# Patient Record
Sex: FEMALE | Race: WHITE | NOT HISPANIC OR LATINO | Employment: OTHER | URBAN - METROPOLITAN AREA
[De-identification: names, ages, dates, MRNs, and addresses within clinical notes are randomized per-mention and may not be internally consistent; named-entity substitution may affect disease eponyms.]

---

## 2017-01-25 ENCOUNTER — COMMUNICATION - HEALTHEAST (OUTPATIENT)
Dept: CARDIOLOGY | Facility: CLINIC | Age: 67
End: 2017-01-25

## 2017-01-26 ENCOUNTER — AMBULATORY - HEALTHEAST (OUTPATIENT)
Dept: CARDIOLOGY | Facility: CLINIC | Age: 67
End: 2017-01-26

## 2017-01-26 ENCOUNTER — COMMUNICATION - HEALTHEAST (OUTPATIENT)
Dept: CARDIOLOGY | Facility: CLINIC | Age: 67
End: 2017-01-26

## 2017-01-26 DIAGNOSIS — E78.5 HYPERLIPEMIA: ICD-10-CM

## 2017-01-27 ENCOUNTER — AMBULATORY - HEALTHEAST (OUTPATIENT)
Dept: CARDIOLOGY | Facility: CLINIC | Age: 67
End: 2017-01-27

## 2017-02-15 ENCOUNTER — AMBULATORY - HEALTHEAST (OUTPATIENT)
Dept: CARDIOLOGY | Facility: CLINIC | Age: 67
End: 2017-02-15

## 2017-02-21 ENCOUNTER — COMMUNICATION - HEALTHEAST (OUTPATIENT)
Dept: CARDIOLOGY | Facility: CLINIC | Age: 67
End: 2017-02-21

## 2017-04-04 ENCOUNTER — COMMUNICATION - HEALTHEAST (OUTPATIENT)
Dept: CARDIOLOGY | Facility: CLINIC | Age: 67
End: 2017-04-04

## 2017-06-12 ENCOUNTER — AMBULATORY - HEALTHEAST (OUTPATIENT)
Dept: CARDIOLOGY | Facility: CLINIC | Age: 67
End: 2017-06-12

## 2017-06-12 DIAGNOSIS — E78.5 HYPERLIPEMIA: ICD-10-CM

## 2017-06-12 LAB
CHOLEST SERPL-MCNC: 280 MG/DL
FASTING STATUS PATIENT QL REPORTED: YES
HDLC SERPL-MCNC: 54 MG/DL
LDLC SERPL CALC-MCNC: 211 MG/DL
TRIGL SERPL-MCNC: 73 MG/DL

## 2017-06-30 ENCOUNTER — COMMUNICATION - HEALTHEAST (OUTPATIENT)
Dept: CARDIOLOGY | Facility: CLINIC | Age: 67
End: 2017-06-30

## 2017-06-30 DIAGNOSIS — E78.5 HYPERLIPEMIA: ICD-10-CM

## 2017-07-06 ENCOUNTER — COMMUNICATION - HEALTHEAST (OUTPATIENT)
Dept: CARDIOLOGY | Facility: CLINIC | Age: 67
End: 2017-07-06

## 2017-10-17 ENCOUNTER — COMMUNICATION - HEALTHEAST (OUTPATIENT)
Dept: CARDIOLOGY | Facility: CLINIC | Age: 67
End: 2017-10-17

## 2017-10-17 DIAGNOSIS — E78.5 HYPERLIPEMIA: ICD-10-CM

## 2017-10-18 ENCOUNTER — COMMUNICATION - HEALTHEAST (OUTPATIENT)
Dept: CARDIOLOGY | Facility: CLINIC | Age: 67
End: 2017-10-18

## 2017-10-18 DIAGNOSIS — E78.5 HYPERLIPEMIA: ICD-10-CM

## 2017-11-14 ENCOUNTER — OFFICE VISIT - HEALTHEAST (OUTPATIENT)
Dept: CARDIOLOGY | Facility: CLINIC | Age: 67
End: 2017-11-14

## 2017-11-14 DIAGNOSIS — I49.1 PAC (PREMATURE ATRIAL CONTRACTION): ICD-10-CM

## 2017-11-14 DIAGNOSIS — E78.00 HYPERCHOLESTEREMIA: ICD-10-CM

## 2017-11-14 DIAGNOSIS — E78.00 PURE HYPERCHOLESTEROLEMIA: ICD-10-CM

## 2017-11-14 DIAGNOSIS — I25.10 CORONARY ARTERY DISEASE INVOLVING NATIVE CORONARY ARTERY OF NATIVE HEART WITHOUT ANGINA PECTORIS: ICD-10-CM

## 2017-11-14 RX ORDER — EZETIMIBE 10 MG/1
10 TABLET ORAL DAILY
Qty: 90 TABLET | Refills: 4 | Status: SHIPPED | OUTPATIENT
Start: 2017-11-14

## 2017-11-14 ASSESSMENT — MIFFLIN-ST. JEOR: SCORE: 1020.86

## 2018-02-12 ENCOUNTER — RECORDS - HEALTHEAST (OUTPATIENT)
Dept: LAB | Facility: CLINIC | Age: 68
End: 2018-02-12

## 2018-02-12 LAB
ALBUMIN SERPL-MCNC: 3.5 G/DL (ref 3.5–5)
ALP SERPL-CCNC: 74 U/L (ref 45–120)
ALT SERPL W P-5'-P-CCNC: 16 U/L (ref 0–45)
AST SERPL W P-5'-P-CCNC: 23 U/L (ref 0–40)
BASOPHILS # BLD AUTO: 0.1 THOU/UL (ref 0–0.2)
BASOPHILS NFR BLD AUTO: 1 % (ref 0–2)
BILIRUB SERPL-MCNC: 0.7 MG/DL (ref 0–1)
BUN SERPL-MCNC: 17 MG/DL (ref 8–22)
CALCIUM SERPL-MCNC: 8.8 MG/DL (ref 8.5–10.5)
CHLORIDE BLD-SCNC: 106 MMOL/L (ref 98–107)
CHOLEST SERPL-MCNC: 263 MG/DL
CHOLEST SERPL-MCNC: 263 MG/DL
CK SERPL-CCNC: 77 U/L (ref 30–190)
CO2 SERPL-SCNC: 26 MMOL/L (ref 22–31)
CREAT SERPL-MCNC: 0.66 MG/DL (ref 0.6–1.1)
CRP SERPL HS-MCNC: 0.2 MG/L (ref 0–3)
EOSINOPHIL # BLD AUTO: 0.3 THOU/UL (ref 0–0.4)
EOSINOPHIL NFR BLD AUTO: 5 % (ref 0–6)
ERYTHROCYTE [DISTWIDTH] IN BLOOD BY AUTOMATED COUNT: 13.3 % (ref 11–14.5)
ERYTHROCYTE [SEDIMENTATION RATE] IN BLOOD BY WESTERGREN METHOD: 8 MM/HR (ref 0–20)
FASTING STATUS PATIENT QL REPORTED: ABNORMAL
GFR SERPL CREATININE-BSD FRML MDRD: >60 ML/MIN/1.73M2
GGT SERPL-CCNC: 10 U/L (ref 0–50)
GLUCOSE BLD-MCNC: 85 MG/DL (ref 70–125)
HCT VFR BLD AUTO: 40.6 % (ref 35–47)
HDLC SERPL-MCNC: 53 MG/DL
HGB BLD-MCNC: 13.3 G/DL (ref 12–16)
IRON SERPL-MCNC: 109 UG/DL (ref 42–175)
LDH SERPL L TO P-CCNC: 155 U/L (ref 125–220)
LDLC SERPL CALC-MCNC: 190 MG/DL
LYMPHOCYTES # BLD AUTO: 1.8 THOU/UL (ref 0.8–4.4)
LYMPHOCYTES NFR BLD AUTO: 37 % (ref 20–40)
MAGNESIUM SERPL-MCNC: 1.9 MG/DL (ref 1.8–2.6)
MCH RBC QN AUTO: 30.5 PG (ref 27–34)
MCHC RBC AUTO-ENTMCNC: 32.8 G/DL (ref 32–36)
MCV RBC AUTO: 93 FL (ref 80–100)
MONOCYTES # BLD AUTO: 0.4 THOU/UL (ref 0–0.9)
MONOCYTES NFR BLD AUTO: 8 % (ref 2–10)
NEUTROPHILS # BLD AUTO: 2.5 THOU/UL (ref 2–7.7)
NEUTROPHILS NFR BLD AUTO: 49 % (ref 50–70)
PHOSPHATE SERPL-MCNC: 3.9 MG/DL (ref 2.5–4.5)
PLATELET # BLD AUTO: 195 THOU/UL (ref 140–440)
PMV BLD AUTO: 11.4 FL (ref 8.5–12.5)
POTASSIUM BLD-SCNC: 3.5 MMOL/L (ref 3.5–5)
PROT SERPL-MCNC: 6.5 G/DL (ref 6–8)
RBC # BLD AUTO: 4.36 MILL/UL (ref 3.8–5.4)
SODIUM SERPL-SCNC: 139 MMOL/L (ref 136–145)
TRIGL SERPL-MCNC: 99 MG/DL
TRIGL SERPL-MCNC: 99 MG/DL
TSH SERPL DL<=0.005 MIU/L-ACNC: 1.27 UIU/ML (ref 0.3–5)
URATE SERPL-MCNC: 3.7 MG/DL (ref 2–7.5)
WBC: 5 THOU/UL (ref 4–11)

## 2018-05-12 ENCOUNTER — RECORDS - HEALTHEAST (OUTPATIENT)
Dept: LAB | Facility: CLINIC | Age: 68
End: 2018-05-12

## 2018-05-14 LAB — BACTERIA SPEC CULT: ABNORMAL

## 2018-05-22 ENCOUNTER — RECORDS - HEALTHEAST (OUTPATIENT)
Dept: LAB | Facility: CLINIC | Age: 68
End: 2018-05-22

## 2018-05-25 LAB — BACTERIA SPEC CULT: ABNORMAL

## 2018-06-04 ENCOUNTER — RECORDS - HEALTHEAST (OUTPATIENT)
Dept: LAB | Facility: CLINIC | Age: 68
End: 2018-06-04

## 2018-06-05 LAB — BACTERIA SPEC CULT: NO GROWTH

## 2018-11-29 ENCOUNTER — SURGERY - HEALTHEAST (OUTPATIENT)
Dept: CARDIOLOGY | Facility: CLINIC | Age: 68
End: 2018-11-29

## 2018-11-29 ASSESSMENT — MIFFLIN-ST. JEOR: SCORE: 998.13

## 2018-11-30 ASSESSMENT — MIFFLIN-ST. JEOR: SCORE: 1014.4

## 2018-12-01 ASSESSMENT — MIFFLIN-ST. JEOR: SCORE: 1001.24

## 2018-12-02 ASSESSMENT — MIFFLIN-ST. JEOR: SCORE: 997.16

## 2018-12-03 ENCOUNTER — AMBULATORY - HEALTHEAST (OUTPATIENT)
Dept: CARDIOLOGY | Facility: CLINIC | Age: 68
End: 2018-12-03

## 2018-12-03 DIAGNOSIS — I25.10 CAD (CORONARY ARTERY DISEASE): ICD-10-CM

## 2018-12-03 ASSESSMENT — MIFFLIN-ST. JEOR: SCORE: 997.61

## 2018-12-07 ENCOUNTER — AMBULATORY - HEALTHEAST (OUTPATIENT)
Dept: CARDIAC REHAB | Facility: HOSPITAL | Age: 68
End: 2018-12-07

## 2018-12-07 DIAGNOSIS — I21.3 STEMI (ST ELEVATION MYOCARDIAL INFARCTION) (H): ICD-10-CM

## 2018-12-07 DIAGNOSIS — I25.119 CORONARY ARTERY DISEASE INVOLVING NATIVE CORONARY ARTERY OF NATIVE HEART WITH ANGINA PECTORIS (H): ICD-10-CM

## 2018-12-07 DIAGNOSIS — Z95.5 STENTED CORONARY ARTERY: ICD-10-CM

## 2018-12-10 ENCOUNTER — AMBULATORY - HEALTHEAST (OUTPATIENT)
Dept: CARDIAC REHAB | Facility: HOSPITAL | Age: 68
End: 2018-12-10

## 2018-12-10 DIAGNOSIS — Z95.5 STENTED CORONARY ARTERY: ICD-10-CM

## 2018-12-10 DIAGNOSIS — I21.02 ST ELEVATION MYOCARDIAL INFARCTION INVOLVING LEFT ANTERIOR DESCENDING (LAD) CORONARY ARTERY (H): ICD-10-CM

## 2018-12-12 ENCOUNTER — AMBULATORY - HEALTHEAST (OUTPATIENT)
Dept: CARDIOLOGY | Facility: CLINIC | Age: 68
End: 2018-12-12

## 2018-12-12 ENCOUNTER — RECORDS - HEALTHEAST (OUTPATIENT)
Dept: ADMINISTRATIVE | Facility: OTHER | Age: 68
End: 2018-12-12

## 2018-12-12 ENCOUNTER — AMBULATORY - HEALTHEAST (OUTPATIENT)
Dept: CARDIAC REHAB | Facility: HOSPITAL | Age: 68
End: 2018-12-12

## 2018-12-12 DIAGNOSIS — Z95.5 STENTED CORONARY ARTERY: ICD-10-CM

## 2018-12-12 DIAGNOSIS — I21.02 ST ELEVATION MYOCARDIAL INFARCTION INVOLVING LEFT ANTERIOR DESCENDING (LAD) CORONARY ARTERY (H): ICD-10-CM

## 2018-12-14 ENCOUNTER — AMBULATORY - HEALTHEAST (OUTPATIENT)
Dept: CARDIOLOGY | Facility: CLINIC | Age: 68
End: 2018-12-14

## 2018-12-14 ENCOUNTER — OFFICE VISIT - HEALTHEAST (OUTPATIENT)
Dept: CARDIOLOGY | Facility: CLINIC | Age: 68
End: 2018-12-14

## 2018-12-14 ENCOUNTER — AMBULATORY - HEALTHEAST (OUTPATIENT)
Dept: CARDIAC REHAB | Facility: HOSPITAL | Age: 68
End: 2018-12-14

## 2018-12-14 DIAGNOSIS — I21.02 ACUTE ST ELEVATION MYOCARDIAL INFARCTION (STEMI) INVOLVING LEFT ANTERIOR DESCENDING (LAD) CORONARY ARTERY (H): ICD-10-CM

## 2018-12-14 DIAGNOSIS — I25.83 CORONARY ARTERY DISEASE DUE TO LIPID RICH PLAQUE: ICD-10-CM

## 2018-12-14 DIAGNOSIS — I21.02 ST ELEVATION MYOCARDIAL INFARCTION INVOLVING LEFT ANTERIOR DESCENDING (LAD) CORONARY ARTERY (H): ICD-10-CM

## 2018-12-14 DIAGNOSIS — I50.22 CHRONIC SYSTOLIC HEART FAILURE (H): ICD-10-CM

## 2018-12-14 DIAGNOSIS — E78.5 DYSLIPIDEMIA, GOAL LDL BELOW 70: ICD-10-CM

## 2018-12-14 DIAGNOSIS — I25.10 CORONARY ARTERY DISEASE DUE TO LIPID RICH PLAQUE: ICD-10-CM

## 2018-12-14 DIAGNOSIS — Z95.5 STENTED CORONARY ARTERY: ICD-10-CM

## 2018-12-14 ASSESSMENT — MIFFLIN-ST. JEOR: SCORE: 996.71

## 2018-12-17 ENCOUNTER — AMBULATORY - HEALTHEAST (OUTPATIENT)
Dept: CARDIAC REHAB | Facility: HOSPITAL | Age: 68
End: 2018-12-17

## 2018-12-17 DIAGNOSIS — I21.02 ST ELEVATION MYOCARDIAL INFARCTION INVOLVING LEFT ANTERIOR DESCENDING (LAD) CORONARY ARTERY (H): ICD-10-CM

## 2018-12-17 DIAGNOSIS — Z95.5 STENTED CORONARY ARTERY: ICD-10-CM

## 2018-12-19 ENCOUNTER — AMBULATORY - HEALTHEAST (OUTPATIENT)
Dept: CARDIAC REHAB | Facility: HOSPITAL | Age: 68
End: 2018-12-19

## 2018-12-19 DIAGNOSIS — Z95.5 STENTED CORONARY ARTERY: ICD-10-CM

## 2018-12-19 DIAGNOSIS — I21.02 ST ELEVATION MYOCARDIAL INFARCTION INVOLVING LEFT ANTERIOR DESCENDING (LAD) CORONARY ARTERY (H): ICD-10-CM

## 2018-12-24 ENCOUNTER — AMBULATORY - HEALTHEAST (OUTPATIENT)
Dept: CARDIAC REHAB | Facility: HOSPITAL | Age: 68
End: 2018-12-24

## 2018-12-24 DIAGNOSIS — Z95.5 STENTED CORONARY ARTERY: ICD-10-CM

## 2018-12-26 ENCOUNTER — AMBULATORY - HEALTHEAST (OUTPATIENT)
Dept: CARDIAC REHAB | Facility: HOSPITAL | Age: 68
End: 2018-12-26

## 2018-12-26 DIAGNOSIS — Z95.5 STENTED CORONARY ARTERY: ICD-10-CM

## 2018-12-26 DIAGNOSIS — I21.02 ST ELEVATION MYOCARDIAL INFARCTION INVOLVING LEFT ANTERIOR DESCENDING (LAD) CORONARY ARTERY (H): ICD-10-CM

## 2018-12-28 ENCOUNTER — AMBULATORY - HEALTHEAST (OUTPATIENT)
Dept: CARDIAC REHAB | Facility: HOSPITAL | Age: 68
End: 2018-12-28

## 2018-12-28 DIAGNOSIS — I21.02 ST ELEVATION MYOCARDIAL INFARCTION INVOLVING LEFT ANTERIOR DESCENDING (LAD) CORONARY ARTERY (H): ICD-10-CM

## 2018-12-28 DIAGNOSIS — Z95.5 STENTED CORONARY ARTERY: ICD-10-CM

## 2018-12-31 ENCOUNTER — AMBULATORY - HEALTHEAST (OUTPATIENT)
Dept: CARDIAC REHAB | Facility: HOSPITAL | Age: 68
End: 2018-12-31

## 2018-12-31 DIAGNOSIS — Z95.5 STENTED CORONARY ARTERY: ICD-10-CM

## 2018-12-31 DIAGNOSIS — I21.02 ST ELEVATION MYOCARDIAL INFARCTION INVOLVING LEFT ANTERIOR DESCENDING (LAD) CORONARY ARTERY (H): ICD-10-CM

## 2019-01-15 ENCOUNTER — RECORDS - HEALTHEAST (OUTPATIENT)
Dept: LAB | Facility: CLINIC | Age: 69
End: 2019-01-15

## 2019-01-15 LAB
CHOLEST SERPL-MCNC: 140 MG/DL
FASTING STATUS PATIENT QL REPORTED: ABNORMAL
HDLC SERPL-MCNC: 37 MG/DL
LDLC SERPL CALC-MCNC: 83 MG/DL
TRIGL SERPL-MCNC: 98 MG/DL

## 2019-01-17 LAB — BACTERIA SPEC CULT: ABNORMAL

## 2019-03-01 ENCOUNTER — RECORDS - HEALTHEAST (OUTPATIENT)
Dept: LAB | Facility: CLINIC | Age: 69
End: 2019-03-01

## 2019-03-04 LAB — BACTERIA SPEC CULT: NORMAL

## 2021-05-22 ENCOUNTER — HEALTH MAINTENANCE LETTER (OUTPATIENT)
Age: 71
End: 2021-05-22

## 2021-05-24 ENCOUNTER — RECORDS - HEALTHEAST (OUTPATIENT)
Dept: ADMINISTRATIVE | Facility: CLINIC | Age: 71
End: 2021-05-24

## 2021-05-25 ENCOUNTER — RECORDS - HEALTHEAST (OUTPATIENT)
Dept: ADMINISTRATIVE | Facility: CLINIC | Age: 71
End: 2021-05-25

## 2021-05-27 ENCOUNTER — RECORDS - HEALTHEAST (OUTPATIENT)
Dept: ADMINISTRATIVE | Facility: CLINIC | Age: 71
End: 2021-05-27

## 2021-05-29 ENCOUNTER — RECORDS - HEALTHEAST (OUTPATIENT)
Dept: ADMINISTRATIVE | Facility: CLINIC | Age: 71
End: 2021-05-29

## 2021-05-31 VITALS — BODY MASS INDEX: 20.93 KG/M2 | HEIGHT: 63 IN | WEIGHT: 118.1 LBS

## 2021-06-02 VITALS — WEIGHT: 113 LBS | BODY MASS INDEX: 20.02 KG/M2

## 2021-06-02 VITALS — BODY MASS INDEX: 19.84 KG/M2 | WEIGHT: 112 LBS

## 2021-06-02 VITALS — BODY MASS INDEX: 19.83 KG/M2 | HEIGHT: 63 IN | BODY MASS INDEX: 19.84 KG/M2 | WEIGHT: 112 LBS | WEIGHT: 111.9 LBS

## 2021-06-02 VITALS — WEIGHT: 112.1 LBS | HEIGHT: 63 IN | BODY MASS INDEX: 19.86 KG/M2

## 2021-06-02 VITALS — BODY MASS INDEX: 19.49 KG/M2 | WEIGHT: 110 LBS

## 2021-06-02 VITALS — WEIGHT: 112 LBS | BODY MASS INDEX: 19.84 KG/M2

## 2021-06-02 VITALS — WEIGHT: 114 LBS | BODY MASS INDEX: 20.19 KG/M2

## 2021-06-02 VITALS — BODY MASS INDEX: 20.19 KG/M2 | WEIGHT: 114 LBS

## 2021-06-02 VITALS — BODY MASS INDEX: 20.02 KG/M2 | WEIGHT: 113 LBS

## 2021-06-14 NOTE — PROGRESS NOTES
[facility] Follow-up Note    Assessment & Plan        1. PAC (premature atrial contraction)  -symptomatic PACs.  For a period of time she took some pindolol and has not had any recurrences.  She suspects this is due to increased sugar in her diet as well as radon electronics in the bedroom.  Since then she's getting along well.  I renewed the pindolol but she takes maybe 5 pills a year on an as-needed basis.  She had only only one bad episode over the summer most likely due to dehydration.    2. Pure hypercholesterolemia -significantly elevated cholesterol 280 with an LDL of 211.  She was intolerant of atorvastatin, did not qualify for juxtapid and is currently on Zetia.  I will renew Zetia, see if she qualifies for any of our research trials, if not we will then try Crestor.  If Crestor not tolerated we will then consider trying again for neuropathic.   3. Coronary artery disease involving native coronary artery of native heart without angina pectoris  -angiography on December 2014 showed normal left main, proximal left anterior descending 50% stenosis, normal circumflex, proximal right coronary artery 25% followed by a mid-35% stenosis.  No symptoms and continue to work on prevention.     Plan  1.  Renew Zetia at [facility] mail-order pharmacy.  2.  Check to see if qualifies for research trial for lipids, if not try Crestor and if intolerant that try PCSK9 inhibitor.  3.  Follow-up with me in 1 year or sooner if needed.    Subjective  CC: 67-year-old white female here for yearly follow-up today.  She tells me one episode while may be dehydrated of the summer she had sustained palpitations and took a pindolol.  Otherwise she has no major sustained palpitations, chest discomfort, shortness breath, PND, orthopnea or peripheral edema.    Medications  Current Outpatient Prescriptions   Medication Sig     ASTAXANTHIN ORAL Take by mouth.     calcium carbonate-vitamin D2 500 mg(1,250mg) -200 unit  "tablet Take 1 tablet by mouth 2 (two) times a day.     COQ10, UBIQUINOL, ORAL Take by mouth.     ezetimibe (ZETIA) 10 mg tablet Take 1 tablet (10 mg total) by mouth daily.     magnesium 30 mg tablet Take 30 mg by mouth 2 (two) times a day.     KRILL OIL ORAL Take by mouth.     pindolol (VISKEN) 5 MG tablet Take 2 tablets (10 mg total) by mouth 2 (two) times a day.       Objective  /58 (Patient Site: Left Arm, Patient Position: Sitting, Cuff Size: Adult Regular)  Pulse 65  Resp 16  Ht 5' 2.75\" (1.594 m)  Wt 118 lb 1.6 oz (53.6 kg)  BMI 21.09 kg/m2    General Appearance:    Alert, cooperative, no distress, appears stated age   Head:    Normocephalic, without obvious abnormality, atraumatic   Throat:   Lips, mucosa, and tongue normal; teeth and gums normal   Neck:   Supple, symmetrical, trachea midline, no adenopathy;        thyroid:  No enlargement/tenderness/nodules; no carotid    bruit or JVD   Back:     Symmetric, no curvature, ROM normal, no CVA tenderness   Lungs:     Clear to auscultation bilaterally, respirations unlabored   Chest wall:    No tenderness or deformity   Heart:    Regular rate and rhythm, S1 and S2 normal, no murmur, rub   or gallop   Abdomen:     Soft, non-tender, bowel sounds active all four quadrants,     no masses, no organomegaly   Extremities:   Normal, atraumatic, no cyanosis or edema   Pulses:   2+ and symmetric all extremities   Skin:   Skin color, texture, turgor normal, no rashes or lesions     Results    Lab Results personally reviewed   Lab Results   Component Value Date    CHOL 280 (H) 06/12/2017    CHOL 345 (H) 09/22/2016     Lab Results   Component Value Date    HDL 54 06/12/2017    HDL 49 (L) 09/22/2016     Lab Results   Component Value Date    LDLCALC 211 (H) 06/12/2017    LDLCALC 273 (H) 09/22/2016     Lab Results   Component Value Date    TRIG 73 06/12/2017    TRIG 114 09/22/2016     Lab Results   Component Value Date    WBC 9.1 03/01/2016    HGB 13.9 03/01/2016    " HCT 42.2 03/01/2016     03/01/2016     Lab Results   Component Value Date    CREATININE 0.78 03/01/2016    BUN 19 03/01/2016     03/01/2016    K 3.9 03/01/2016    CO2 23 03/01/2016     Review of Systems:   General: WNL  Eyes: WNL  Ears/Nose/Throat: WNL  Lungs: WNL  Heart: Irregular Heartbeat (palpitatioms)  Stomach: WNL  Bladder: WNL  Muscle/Joints: WNL  Skin: WNL  Nervous System: WNL  Mental Health: WNL     Blood: WNL

## 2021-06-16 PROBLEM — I50.20 HEART FAILURE WITH REDUCED EJECTION FRACTION (H): Status: ACTIVE | Noted: 2018-12-14

## 2021-06-16 PROBLEM — I21.02 ACUTE ST ELEVATION MYOCARDIAL INFARCTION (STEMI) INVOLVING LEFT ANTERIOR DESCENDING (LAD) CORONARY ARTERY (H): Status: ACTIVE | Noted: 2018-11-29

## 2021-06-22 NOTE — PROGRESS NOTES
ITP ASSESSMENT   Assessment Day: 30 Day/Discharge Note:    Session Number: 10  Precautions: Standard    Diagnosis: MI;Stent    Risk Stratification: High    Referring Provider: Jaison Lloyd MD  EXERCISE  Exercise Assessment: Discharge       6 Minute Walk Test   Pre   Pre Exercise HR: 67                    Pre Exercise BP: 101/57      Peak  Peak HR: 106                   Peak BP: 110/70    Peak feet: 1700    Peak O2 SAT: 99    Peak RPE: 12    Peak MPH: 3.22      Symptoms:  Peak Symptoms: none      5 mins. Post  5 Min Post HR: 68    5 Min Post BP: 93/58                           Exercise Plan  Goals Next 30 days  ADL'S: Consistent home exercise Program    Leisure: Babysit Grand daughters, with out fatigue;  Walk or Treadmill 10-15'; 5-7 days er week    Work: Retired      Education Goals: All goals in this section met    Education Goals Met: Has system for taking medication.;Patient can state cardiac s/s and appropriate emergency response.;Medication review.                          Goals Met  Initial ADL's goals met: Tolerates housework    Initial Leisure goals met: Tolerates babysitting granddtrs    No Data Recorded  Initial Progression: Reports she has resumed all tasks at home as to prior to Her MI/Stents      Exercise Prescription  Exercise Mode: Treadmill;Nustep;Bike;Arm Erg.    Frequency: 2-3 x week     Duration: 40'    Intensity / THR: 20-30 beats above resting heart rate    RPE 11-14  Progression / Met level: 2.8-3.2    Resistive Training?: Yes      Current Exercise (mins/week): 180      Interventions  Home Exercise:  Mode: Walk /Treadmill    Frequency: 5-7 days per week    Duration: 30-40'      Education Material : Educational videos;Provide written material;Individual education and counseling;Offer educational classes      Education Completed  Exercise Education Completed: Cardiac Anatomy;Signs and Symptoms;Emergency Plan;RPE;Medication review;Home Exercise;Warm up/cool down;FITT Principles;BP/HR  "Reponse to exercise;Benefits of Exercise;End point of exercise              Exercise Follow-up/Discharge  Follow up/Discharge: Reviewed Home Program, SX of Intolerance; Emergency plan.  Pt plans to use treadmill daily           NUTRITION  Nutrition Assessment: Discharge      Nutrition Risk Factors:  Nutrition Risk Factors: Dyslipidemia  Cholesterol: 262  LDL: 193  HDL: 54  Triglycerides: 74      Nutrition Plan  Interventions  Diet Consult: Completed    Other Nutrition Intervention: Diet Class;Therapist/Pt Discussion;Educational Videos;Provide with Written Material    Initial Rate Your Plate Score: 62      Education Completed  Nutrition Education Completed: Low Saturated fat diet;Low sodium diet      Goals  Nutrition Goals (Next 30 days): Patient demonstrated understanding of cardiac nutrition, no goals identified for the next 30 days      Goals Met  Nutrition Goals Met: Patient follows a low sodium diet;Patient states following a low saturated fat diet      Height, Weight, and  BMI  Weight: 110 lb (49.9 kg)  Height: 5' 3\" (1.6 m)  BMI: 19.49      Nutrition Follow-up  Follow-up/Discharge: Patient eats a low sodium plant based diet; does not use any fats.  Discussed healthy fats to include in her diet but patient was not interested in adding any fats.        Other Risk Factors  Other Risk Factor Assessment: Discharge      HTN Risk Factor: Hypertension      Pre Exercise BP: 102/60  Post Exercise BP: 118/60      Hypertension Plan  Goals  HTN Goals: Follow low sodium diet;Take medication as prescribed;Exercises regularly      Goals Met  HTN Goals Met: Take medication as prescribed      HTN Interventions  HTN Interventions: Diet consult;Therapist/patient discussion;Provide written material;Offer educational videos;Offer educational classes      HTN Education Completed  HTN Education Completed: Medication review;Risk factor overview      Tobacco Risk Factor: NA          Tobacco Education Completed  No Data Recorded    Risk " Factor Follow-up   Follow-up/Discharge: Encouraged pt to attend education classes as needed         PSYCHOSOCIAL  Psychosocial Assessment: Discharge       Tessa MOURA Q of L Summary Score: 17      JAZ-D Score: 10      Psychosocial Risk Factor: Stress      Psychosocial Plan  Interventions  Interventions: Offer educational videos and classes;Provide written material;Individual education and counseling      Education Completed  Education Completed: Effects of stress on body      Goals  Goals (Next 30 days): Practicing stress management skills      Goals Met  Goals Met: Identify stressors;Oriented to stress management classes;Identified Support system      Psychosocial Follow-up  Follow-up/Discharge: Encouraged pt attend Stress/ Relaxation classes           Pt completed 10 Phase 2 sessions;  Requested to be done, secondary to change in Insurance;  Reviewed  HP; SX of Intolerance;  Emergency plan.  Also reviewed importance of daily wts; and low Na Diet  Goals Met.      Signature: _____________________________________________________________    Date: __________________    Time: __________________See Doc Flowsheet

## 2021-06-22 NOTE — PROGRESS NOTES
12/7/18Cardiac Rehab  Phase II Assessment    Assessment Date: 12/718    Diagnosis: STEMI  Date of Onset: 11/29/18  Procedure: PCI/Stent LAD Date of Onset: 11/29/18  ICD/Pacemaker: No   Post-op Complications:   ECG History: A-Fib, RVR;  V-Tach; NSR after PCI     EF%:35%  Past Medical History:   Patient Active Problem List   Diagnosis     Coronary artery disease due to lipid rich plaque     Acute ST elevation myocardial infarction (STEMI) involving left anterior descending (LAD) coronary artery (H)     Dyslipidemia, goal LDL below 70     Past Medical History:   Diagnosis Date     Acute ST elevation myocardial infarction (STEMI) involving left anterior descending (LAD) coronary artery (H) 11/29/2018     Atrial fibrillation, unspecified type (H)      Coronary artery disease due to lipid rich plaque 12/19/2014     Dyslipidemia, goal LDL below 70 12/19/2014     Kidney stones     x7     PAC (premature atrial contraction)            Physical Assessment  Precautions/ Physical Limitations:   Oxygen: No  O2 Sats: % Lung Sounds:  Edema:   Incisions:   Sleeping Pattern: fair   Appetite: fair   Nutrition Risk Screen: Appetite/Intake    Pain  Location:   Characteristics:  Intensity: (0-10 scale) 0  Current Pain Management:   Intervention:   Response:     Psychosocial/ Emotional Health  1. In the past 12 months, have you been in a relationship where you have been abused physically, emotionally, sexually or financially? No  notified: NA  2. Who do you turn to for emotional support?: Friends and Family  3. Do you have cultural or spiritual needs? No  4. Have there been any major life changes in the past 12 months? No    Referral Information  Primary Physician: Jaison Lloyd MD  Cardiologist: Dr. Graham  Surgeon:     Home exercise/Equipment: Treadmill    Patient's long-term goal(s): Resume all previous tasks    1. Living Accommodations: Home Steps: Yes      Support people at home: 90 year old mother; Pt  is caregiver;  Dtr and 2 grand daughters live next door   2. Marital Status: single  3. Family is able to assist with cares: Yes      Anglican/Community involvement: Yes  4. Recreation/Hobbies: Play tennis;  Granddaughter's activities; Walk; treadmill        See Doc Flowsheet

## 2021-06-22 NOTE — PROGRESS NOTES
Msg received on hospital discharge physician line from [facility]. Pt needs to follow up with NP in 2 weeks post-intervention. She needs to see LBF in 4-6 weeks with a fasting lipid prior to seeing LBF. Order placed and msg sent to scheduling to arrange. [place] [place]

## 2021-06-22 NOTE — PROGRESS NOTES
Patient seen in clinic for HF education s/p recent hospital discharge 12-03-18.  Reviewed HF Binder that includes the  HF Sx Awareness & Action plan  handout and  A Stronger Pump  booklet and Weight log booklet highlighting :  _X__Na management in diet  __X_importance of daily wts  __X_medication review and   importance of compliance     Instructed patient in signs and sx of heart failure, reiterated when to call clinic - reviewed HF hotline # [phone] and after hours call # [phone].  Majority of time was spent reviewing: weight log, HF action plan when to call and who, and diet. Patient reports eating only plant based foods, will give it 1 year to see if benefit. Does home cooking, doesn't eat out much, and fresh foods.   Patient verbalized understanding of HF discussion.  Plan for f/u with continued HF education reviewed. Pt attending CR at [facility] and is having a meeting with nutritionist today at [facility].  Has follow-up in 3-4 weeks with HF CNP and LBF.  Patient given opportunity to ask questions. Pt verbalized understanding. Given clinic #, and HF # to call if any questions. CAIN,RN

## 2021-06-22 NOTE — PROGRESS NOTES
ITP ASSESSMENT   Assessment Day: Initial    Session Number: 1  Precautions: S/P MI    Diagnosis: MI;Stent    Risk Stratification: High    Referring Provider: Brent Bell MD  EXERCISE  Exercise Assessment: Initial       6 Minute Walk Test   Pre   Pre Exercise HR: 72                    Pre Exercise BP: 84/63      Peak  Peak HR: 90                   Peak BP: 105/68    Peak feet: 1300    Peak O2 SAT: 100    Peak RPE: 12    Peak MPH: 2.46      Symptoms:  Peak Symptoms: None      5 mins. Post  5 Min Post HR: 74    5 Min Post BP: 106/63                           Exercise Plan  Goals Next 30 days  ADL'S: Moderate housecleaning without fatigue    Leisure: Babysit Grand daughters, with out fatigue;  Walk or Treadmill 10-15'; 5-7 days er week    Work: Retired      Education Goals: All goals in this section met    Education Goals Met: Has system for taking medication.;Patient can state cardiac s/s and appropriate emergency response.;Medication review.      Exercise Prescription  Exercise Mode: Treadmill;Nustep;Bike;Arm Erg.    Frequency: 2-3 x week     Duration: 40'    Intensity / THR: 20-30 beats above resting heart rate    RPE 11-14  Progression / Met level: 2.8-3.2    Resistive Training?: Yes      Current Exercise (mins/week): 10      Interventions  Home Exercise:  Mode: Walk/Treadmill    Frequency: 5-7 days per week    Duration: 10-15'      Education Material : Educational videos;Provide written material;Individual education and counseling;Offer educational classes      Education Completed  Exercise Education Completed: Cardiac Anatomy;Signs and Symptoms;Emergency Plan;RPE;Medication review;Home Exercise;Warm up/cool down;FITT Principles;BP/HR Reponse to exercise;Benefits of Exercise;End point of exercise              Exercise Follow-up/Discharge  Follow up/Discharge: Encouraged indoor walking or using treadmill 5-7 days per week   NUTRITION  Nutrition Assessment: Initial      Nutrition Risk Factors:  Nutrition Risk  "Factors: Dyslipidemia  Cholesterol: 262  LDL: 193  HDL: 54  Triglycerides: 74      Nutrition Plan  Interventions  Other Nutrition Intervention: Diet Class;Therapist/Pt Discussion;Educational Videos;Provide with Written Material      Education Completed  Nutrition Education Completed: Risk factor overview      Goals  Nutrition Goals (Next 30 days): Patient can identify their risk factors for CAD;Provide Rate your Plate Survey;Review Dietitian schedule      Goals Met  Nutrition Goals Met: Patient can identify their risk factors for CAD;Provided Rate your Plate Survey;Reviewed Dietitian schedule      Height, Weight, and  BMI  Weight: 113 lb (51.3 kg)  Height: 5' 3\" (1.6 m)  BMI: 20.02      Nutrition Follow-up  Follow-up/Discharge: Encouraged pt to complete Diet HAbit Survey         Other Risk Factors  Other Risk Factor Assessment: Initial      HTN Risk Factor: Hypertension      Pre Exercise BP: (!) 84/63  Post Exercise BP: 106/63      Hypertension Plan  Goals  HTN Goals: Follow low sodium diet;Take medication as prescribed;Exercises regularly      Goals Met  HTN Goals Met: Take medication as prescribed      HTN Interventions  HTN Interventions: Diet consult;Therapist/patient discussion;Provide written material;Offer educational videos;Offer educational classes      HTN Education Completed  HTN Education Completed: Medication review;Risk factor overview      Tobacco Risk Factor: NA        Risk Factor Follow-up   Follow-up/Discharge: Will Provide Risk Factor education as needed     PSYCHOSOCIAL  Psychosocial Assessment: Initial       [facility] Q of L Summary Score: 17      JAZ-D Score: 10      Psychosocial Risk Factor: Stress      Psychosocial Plan  Interventions  If JAZ-D > 15 send letter to MD  Interventions: Offer educational videos and classes;Provide written material;Individual education and counseling      Education Completed  Education Completed: Effects of stress on body      Goals  Goals (Next 30 days): " Practicing stress management skills      Goals Met  Goals Met: Identify stressors;Oriented to stress management classes;Identified Support system      Psychosocial Follow-up  Follow-up/Discharge: Reports she has a Good Support System             Patient involved in Goal setting?: Yes      Signature: _____________________________________________________________    Date: __________________    Time: __________________

## 2021-06-27 NOTE — PROGRESS NOTES
Progress Notes by Britney Mejias CNP at 12/14/2018  9:10 AM     Author: Britney Mejias CNP Service: -- Author Type: Nurse Practitioner    Filed: 12/14/2018 11:13 AM Encounter Date: 12/14/2018 Status: Signed    : Britney Mejias CNP (Nurse Practitioner)                 Click to link to [facility] Heart Care       [facility] HEART CARE NOTE      Assessment/Recommendations   1.  Coronary artery disease: She was hospitalized November 29 - December 3 with a STEMI.  She had drug-eluting stent to proximal LAD.  Unable to treat distal LAD. Dual antiplatelet therapy is being used with aspirin indefinitely and ticagrelor for 1 year. We discussed the importance of antiplatelet therapy and talking with her cardiologist prior to stopping these medications for any reason.  Puncture site is soft with no hematoma.      Risk factor modification and lifestyle management topics were discussed including managing comorbidities, heart healthy diet and exercise.  She is participating in cardiac rehab.    2.  Dyslipidemia: Sarai Steiner is now on high intensity statin therapy with atorvastatin 80 mg daily.  She has a long history of significantly elevated LDL.  LDL is 193.  She had previously declined statin but was taking Zetia.  Since MI, she is now taking atorvastatin 80 mg daily along with Zetia 10 mg daily.  She denies any side effects.  However, Sarai is hesitant about being on statins.  We discussed the importance of statins to lower cholesterol and prevent future MI.  We discussed a diet low in saturated fat, weight loss, and exercise along with medication for better control of cholesterol.     3.  Ischemic cardiomyopathy, heart failure with reduced ejection fraction, ejection fraction 35%, NYHA class I: She denies any symptoms of acute heart failure.  Her shortness of breath has resolved.  Her weight has remained stable.  We reviewed heart failure diagnosis, medications, treatment plan, low sodium  diet, weight monitoring, and symptom monitoring.  She met with a heart failure nurse clinician to further discuss.  She is only able to tolerate metoprolol succinate 12.5 mg daily due to hypotension.  Lisinopril and spironolactone were stopped during hospitalization due to hypotension.    4.  Paroxysmal atrial fibrillation: She had atrial fibrillation when she first came into the hospital with MI.  She converted spontaneously after PCI.  Atrial fibrillation is felt to be related to MI.  Anticoagulation was not recommended.  Cardiac rehab has not reported any atrial fibrillation.    Follow-up in the heart failure clinic on January 7 and with Dr. Graham on January 31.     History of Present Illness    Sarai Steiner is seen at [facility] for post coronary intervention follow up.  She was hospitalized November 29 - December 3 with a STEMI.  She had drug-eluting stent to proximal LAD.  Unable to treat distal LAD.  She had atrial fibrillation but converted to sinus rhythm after PCI.  Atrial fibrillation was felt to be related to MI and she was not started on anticoagulation.  Dual antiplatelet therapy is being used with aspirin indefinitely and ticagrelor for 1 year.  Echocardiogram on November 30, 2018 showed an ejection fraction of 35%, mild aortic regurgitation, moderate mitral regurgitation, and moderate tricuspid regurgitation.  Blood pressure was low during hospitalization so lisinopril and spironolactone were stopped.  Metoprolol dose was decreased.    She states that her shortness of breath has improved significantly since hospital discharge.  She no longer has shortness of breath walking upstairs.  She is tolerating cardiac rehab with no symptoms.  Her blood pressures tend to run low but she denies any lightheadedness or dizziness..  She denies fatigue, lightheadedness, shortness of breath, dyspnea on exertion, orthopnea, chest pain and lower extremity edema.      Results for orders placed during the  hospital encounter of 11/29/18   Cardiac Catheterization [CATH01] 11/29/2018    Narrative   Prox LAD lesion is 100% stenosed.    Dist LAD lesion is 100% stenosed.    Ost 1st Diag lesion is 100% stenosed.    Prox RCA lesion is 30% stenosed.    Mid RCA lesion is 50% stenosed.     Occlusion appears to be in LAD.  Diagonal wire with low pressure inflation   with small balloon.  Ostial stenosis at end of case but flow normal  Impacted occlusion of distal LAD, crossed with wire but unable to restore   patency with dotter and low pressure inflation  Atrial fibrillation with tachycardia          Physical Examination Review of Systems   Vitals:    12/14/18 0924   BP: 100/50   Pulse: 68   Resp: 16     Body mass index is 19.82 kg/m .  Wt Readings from Last 3 Encounters:   12/14/18 111 lb 14.4 oz (50.8 kg)   12/12/18 114 lb (51.7 kg)   12/10/18 113 lb (51.3 kg)       General Appearance:     Alert, cooperative and in no acute distress.   ENT/Mouth: membranes moist, no oral lesions or bleeding gums.      EYES:  no scleral icterus, normal conjunctivae   Neck: no carotid bruits or thyromegaly   Chest/Lungs:   lungs are clear to auscultation, no rales or wheezing, respirations unlabored   Cardiovascular:   Regular. Normal first and second heart sounds; the carotid, radial and posterior tibial pulses are intact, no edema bilateral lower extremities    Abdomen:  Soft, nontender, nondistended, bowel sounds present   Extremities: no cyanosis or clubbing   Skin:  Neurologic: warm, dry.  mood and affect are appropriate, alert and oriented x3     Puncture Site: Left radial site is soft with no bruising.  Radial pulses and Pedal pulses intact and symmetrical.  CMS intact.      General: WNL  Eyes: WNL  Ears/Nose/Throat: WNL  Lungs: WNL  Heart: Shortness of Breath with activity(palpations)  Stomach: WNL  Bladder: WNL  Muscle/Joints: WNL  Skin: WNL  Nervous System: WNL  Mental Health: WNL     Blood: WNL     Medical History  Surgical History  Family History Social History   Past Medical History:   Diagnosis Date   ? Acute ST elevation myocardial infarction (STEMI) involving left anterior descending (LAD) coronary artery (H) 11/29/2018   ? Atrial fibrillation, unspecified type (H)    ? Coronary artery disease due to lipid rich plaque 12/19/2014   ? Dyslipidemia, goal LDL below 70 12/19/2014   ? Kidney stones     x7   ? PAC (premature atrial contraction)     Past Surgical History:   Procedure Laterality Date   ? CARDIAC CATHETERIZATION  12/19/2014   ? CORONARY STENT PLACEMENT  11/29/2018   ? CV CORONARY ANGIOGRAM N/A 11/29/2018    Procedure: Coronary Angiogram;  Surgeon: Brent Bell MD;  Location: [facility] Cath Lab;  Service: Cardiology   ? HYSTERECTOMY     ? TRIGGER FINGER RELEASE Bilateral    ? URETEROSCOPY  2006    Family History   Problem Relation Age of Onset   ? Heart disease Mother    ? Heart disease Father    ? Urolithiasis Neg Hx    ? Clotting disorder Neg Hx    ? Gout Neg Hx    ? Diabetes Neg Hx    ? Cancer Neg Hx     Social History     Socioeconomic History   ? Marital status: Single     Spouse name: Not on file   ? Number of children: Not on file   ? Years of education: Not on file   ? Highest education level: Not on file   Social Needs   ? Financial resource strain: Not on file   ? Food insecurity - worry: Not on file   ? Food insecurity - inability: Not on file   ? Transportation needs - medical: Not on file   ? Transportation needs - non-medical: Not on file   Occupational History   ? Occupation: Retired   Tobacco Use   ? Smoking status: Never Smoker   ? Smokeless tobacco: Never Used   Substance and Sexual Activity   ? Alcohol use: No     Comment: occas wine   ? Drug use: No   ? Sexual activity: Not on file   Other Topics Concern   ? Not on file   Social History Narrative   ? Not on file          Medications  Allergies   Current Outpatient Medications   Medication Sig Dispense Refill   ? aspirin 81 mg chewable tablet Chew 1 tablet  (81 mg total) daily. 30 tablet 0   ? atorvastatin (LIPITOR) 80 MG tablet Take 1 tablet (80 mg total) by mouth daily. 30 tablet 0   ? calcium carbonate-vitamin D2 500 mg(1,250mg) -200 unit tablet Take 2 tablets by mouth daily .           ? COQ10, UBIQUINOL, ORAL Take 1 tablet by mouth daily .           ? ezetimibe (ZETIA) 10 mg tablet Take 1 tablet (10 mg total) by mouth daily. 90 tablet 4   ? magnesium 30 mg tablet Take 60 mg by mouth daily .           ? metoprolol succinate (TOPROL-XL) 25 MG Take 0.5 tablets (12.5 mg total) by mouth at bedtime. 30 tablet 0   ? nitroglycerin (NITROSTAT) 0.4 MG SL tablet Place 1 tablet (0.4 mg total) under the tongue every 5 (five) minutes as needed for chest pain. 1 Bottle 0   ? ticagrelor (BRILINTA) 90 mg Tab Take 1 tablet (90 mg total) by mouth 2 (two) times a day. 60 tablet 1     No current facility-administered medications for this visit.       No Known Allergies      Lab Results    Chemistry CBC BNP   Lab Results   Component Value Date    CREATININE 0.62 12/02/2018    BUN 16 12/02/2018     (L) 12/02/2018    K 3.7 12/03/2018     12/02/2018    CO2 20 (L) 12/02/2018     Creatinine (mg/dL)   Date Value   12/02/2018 0.62   12/01/2018 0.67   11/29/2018 0.66   11/29/2018 0.72    Lab Results   Component Value Date    WBC 19.6 (H) 11/29/2018    HGB 12.9 11/30/2018    HCT 42.6 11/29/2018    MCV 94 11/29/2018     11/29/2018    No results found for: BNP  No results found for: BNP           Britney Mejias, [facility]

## 2021-07-13 ENCOUNTER — RECORDS - HEALTHEAST (OUTPATIENT)
Dept: ADMINISTRATIVE | Facility: CLINIC | Age: 71
End: 2021-07-13

## 2021-07-14 PROBLEM — E78.00 HYPERCHOLESTEREMIA: Status: RESOLVED | Noted: 2017-11-14 | Resolved: 2018-12-03

## 2021-07-21 ENCOUNTER — RECORDS - HEALTHEAST (OUTPATIENT)
Dept: ADMINISTRATIVE | Facility: CLINIC | Age: 71
End: 2021-07-21

## 2021-08-03 PROBLEM — I47.29 NONSUSTAINED VENTRICULAR TACHYCARDIA (H): Status: RESOLVED | Noted: 2018-11-29 | Resolved: 2018-12-03

## 2021-09-11 ENCOUNTER — HEALTH MAINTENANCE LETTER (OUTPATIENT)
Age: 71
End: 2021-09-11

## 2021-12-30 ENCOUNTER — HOSPITAL ENCOUNTER (EMERGENCY)
Facility: HOSPITAL | Age: 71
Discharge: HOME | End: 2021-12-30
Attending: FAMILY MEDICINE | Admitting: FAMILY MEDICINE
Payer: COMMERCIAL

## 2021-12-30 VITALS
HEART RATE: 69 BPM | DIASTOLIC BLOOD PRESSURE: 55 MMHG | SYSTOLIC BLOOD PRESSURE: 119 MMHG | OXYGEN SATURATION: 97 % | TEMPERATURE: 98 F | RESPIRATION RATE: 16 BRPM | WEIGHT: 107 LBS | BODY MASS INDEX: 18.95 KG/M2

## 2021-12-30 DIAGNOSIS — I48.0 PAROXYSMAL ATRIAL FIBRILLATION (H): ICD-10-CM

## 2021-12-30 DIAGNOSIS — R00.2 PALPITATIONS: ICD-10-CM

## 2021-12-30 LAB
ANION GAP SERPL CALCULATED.3IONS-SCNC: 10 MMOL/L (ref 5–18)
ATRIAL RATE - MUSE: 68 BPM
BASOPHILS # BLD AUTO: 0.1 10E3/UL (ref 0–0.2)
BASOPHILS NFR BLD AUTO: 1 %
BUN SERPL-MCNC: 24 MG/DL (ref 8–28)
CALCIUM SERPL-MCNC: 9.6 MG/DL (ref 8.5–10.5)
CHLORIDE BLD-SCNC: 109 MMOL/L (ref 98–107)
CO2 SERPL-SCNC: 22 MMOL/L (ref 22–31)
CREAT SERPL-MCNC: 0.64 MG/DL (ref 0.6–1.1)
DIASTOLIC BLOOD PRESSURE - MUSE: NORMAL MMHG
EOSINOPHIL # BLD AUTO: 0.3 10E3/UL (ref 0–0.7)
EOSINOPHIL NFR BLD AUTO: 4 %
ERYTHROCYTE [DISTWIDTH] IN BLOOD BY AUTOMATED COUNT: 13.5 % (ref 10–15)
GFR SERPL CREATININE-BSD FRML MDRD: >90 ML/MIN/1.73M2
GLUCOSE BLD-MCNC: 89 MG/DL (ref 70–125)
HCT VFR BLD AUTO: 42.8 % (ref 35–47)
HGB BLD-MCNC: 14.4 G/DL (ref 11.7–15.7)
IMM GRANULOCYTES # BLD: 0 10E3/UL
IMM GRANULOCYTES NFR BLD: 0 %
INTERPRETATION ECG - MUSE: NORMAL
LYMPHOCYTES # BLD AUTO: 2.5 10E3/UL (ref 0.8–5.3)
LYMPHOCYTES NFR BLD AUTO: 36 %
MAGNESIUM SERPL-MCNC: 1.9 MG/DL (ref 1.8–2.6)
MCH RBC QN AUTO: 30.9 PG (ref 26.5–33)
MCHC RBC AUTO-ENTMCNC: 33.6 G/DL (ref 31.5–36.5)
MCV RBC AUTO: 92 FL (ref 78–100)
MONOCYTES # BLD AUTO: 0.6 10E3/UL (ref 0–1.3)
MONOCYTES NFR BLD AUTO: 8 %
NEUTROPHILS # BLD AUTO: 3.7 10E3/UL (ref 1.6–8.3)
NEUTROPHILS NFR BLD AUTO: 51 %
NRBC # BLD AUTO: 0 10E3/UL
NRBC BLD AUTO-RTO: 0 /100
P AXIS - MUSE: 39 DEGREES
PLATELET # BLD AUTO: 187 10E3/UL (ref 150–450)
POTASSIUM BLD-SCNC: 4.1 MMOL/L (ref 3.5–5)
PR INTERVAL - MUSE: 148 MS
QRS DURATION - MUSE: 84 MS
QT - MUSE: 424 MS
QTC - MUSE: 450 MS
R AXIS - MUSE: 52 DEGREES
RBC # BLD AUTO: 4.66 10E6/UL (ref 3.8–5.2)
SODIUM SERPL-SCNC: 141 MMOL/L (ref 136–145)
SYSTOLIC BLOOD PRESSURE - MUSE: NORMAL MMHG
T AXIS - MUSE: 70 DEGREES
TROPONIN I SERPL-MCNC: <0.01 NG/ML (ref 0–0.29)
VENTRICULAR RATE- MUSE: 68 BPM
WBC # BLD AUTO: 7.2 10E3/UL (ref 4–11)

## 2021-12-30 PROCEDURE — 96360 HYDRATION IV INFUSION INIT: CPT

## 2021-12-30 PROCEDURE — 250N000013 HC RX MED GY IP 250 OP 250 PS 637: Performed by: FAMILY MEDICINE

## 2021-12-30 PROCEDURE — 80048 BASIC METABOLIC PNL TOTAL CA: CPT | Performed by: FAMILY MEDICINE

## 2021-12-30 PROCEDURE — 93005 ELECTROCARDIOGRAM TRACING: CPT | Performed by: FAMILY MEDICINE

## 2021-12-30 PROCEDURE — 258N000003 HC RX IP 258 OP 636: Performed by: FAMILY MEDICINE

## 2021-12-30 PROCEDURE — 84484 ASSAY OF TROPONIN QUANT: CPT | Performed by: FAMILY MEDICINE

## 2021-12-30 PROCEDURE — 99284 EMERGENCY DEPT VISIT MOD MDM: CPT | Mod: 25

## 2021-12-30 PROCEDURE — 36415 COLL VENOUS BLD VENIPUNCTURE: CPT | Performed by: FAMILY MEDICINE

## 2021-12-30 PROCEDURE — 83735 ASSAY OF MAGNESIUM: CPT | Performed by: FAMILY MEDICINE

## 2021-12-30 PROCEDURE — 85014 HEMATOCRIT: CPT | Performed by: FAMILY MEDICINE

## 2021-12-30 RX ORDER — ASPIRIN 81 MG/1
324 TABLET, CHEWABLE ORAL ONCE
Status: COMPLETED | OUTPATIENT
Start: 2021-12-30 | End: 2021-12-30

## 2021-12-30 RX ADMIN — SODIUM CHLORIDE 500 ML: 9 INJECTION, SOLUTION INTRAVENOUS at 20:16

## 2021-12-30 RX ADMIN — ASPIRIN 81 MG CHEWABLE TABLET 324 MG: 81 TABLET CHEWABLE at 20:14

## 2021-12-30 ASSESSMENT — ENCOUNTER SYMPTOMS
CHEST TIGHTNESS: 1
LIGHT-HEADEDNESS: 1
PALPITATIONS: 1
NAUSEA: 0

## 2021-12-31 NOTE — ED TRIAGE NOTES
Patient arrives c/o palpitations, chest and back tightness that started one hour ago. Symptoms have now subsided.

## 2021-12-31 NOTE — DISCHARGE INSTRUCTIONS
Follow-up with your primary care doctor as needed.  Consider follow-up with cardiology as well.  Make sure you are staying well-hydrated, getting plenty of rest.  Avoid caffeine

## 2021-12-31 NOTE — ED PROVIDER NOTES
EMERGENCY DEPARTMENT ENCOUNTER      NAME: Sarai Steiner  AGE: 71 year old female  YOB: 1950  MRN: [id number]  EVALUATION DATE & TIME: 12/30/2021  7:43 PM    PCP: Jaison Lloyd    ED PROVIDER: Nestor Avina M.D.    Chief Complaint   Patient presents with     Palpitations       FINAL IMPRESSION:  1. Palpitations    2. Paroxysmal atrial fibrillation (H)        ED COURSE & MEDICAL DECISION MAKING:    Pertinent Labs & Imaging studies personally reviewed and interpreted by me. (See chart for details)  7:49 PM I met with the patient, obtained an initial history, performed an examination and discussed the plan. PPE worn throughout all interactions with the patient, including gloves, surgical mask, N95 mask, safety glasses, and surgical cap.  Differential diagnosis includes but not limited to atrial fibrillation, atrial flutter, AVNRT, PVC, PAC, V. tach, V. fib, hypothyroidism, hyperthyroidism, electrolyte disturbance, pulmonary embolism.  Patient presents with palpitations, she has a history of paroxysmal atrial fibrillation but this episode was lasting longer than usual and accompanied by some chest pain so she decided to come in.  Currently in sinus rhythm.  Labs are ordered and plan to discharge.  9:00 PM labs so far reassuring including normal electrolytes, normal magnesium.  Troponin is still pending.  If this is negative patient be discharged       At the conclusion of the encounter I discussed the results of all of the tests and the disposition. The questions were answered. The patient or family acknowledged understanding and was agreeable with the care plan.     PROCEDURES:   Procedures    MEDICATIONS GIVEN IN THE EMERGENCY:  Medications   0.9% sodium chloride BOLUS (500 mLs Intravenous New Bag 12/30/21 2016)   aspirin (ASA) chewable tablet 324 mg (324 mg Oral Given 12/30/21 2014)       NEW PRESCRIPTIONS STARTED AT TODAY'S ER VISIT  New Prescriptions    No medications on file        =================================================================    HPI    Patient information was obtained from: Patient       Sarai Steiner is a 71 year old female with a pertinent history of CAD, dyslipidemia, STEMI, and heart failure who presents to this ED via private car for evaluation of palpitations.     The patient reports having an a-fib episode tonight. She reports that she has had them before but this time it wouldn't go away on its own until she got to the ED. She reports that she had a major heart attack three years ago. She endorses chest tightness and lightheadedness. The patient is otherwise in her usual state of health and denies any nausea or any other concerns at this time.       REVIEW OF SYSTEMS   Review of Systems   Respiratory: Positive for chest tightness.    Cardiovascular: Positive for palpitations.   Gastrointestinal: Negative for nausea.   Neurological: Positive for light-headedness.   All other systems reviewed and are negative.    PAST MEDICAL HISTORY:  No past medical history on file.    PAST SURGICAL HISTORY:  Past Surgical History:   Procedure Laterality Date     CARDIAC CATHETERIZATION  12/19/2014     CORONARY STENT PLACEMENT  11/29/2018     CV CORONARY ANGIOGRAM N/A 11/29/2018    Procedure: Coronary Angiogram;  Surgeon: Brent Bell MD;  Location: [facility] Cath Lab;  Service: Cardiology     HYSTERECTOMY       RELEASE TRIGGER FINGER Bilateral      URETEROSCOPY  2006       CURRENT MEDICATIONS:    Current Facility-Administered Medications   Medication     0.9% sodium chloride BOLUS     Current Outpatient Medications   Medication     aspirin 81 mg chewable tablet     atorvastatin (LIPITOR) 80 MG tablet     calcium carbonate-vitamin D2 500 mg(1,250mg) -200 unit tablet     COQ10, UBIQUINOL, ORAL     ezetimibe (ZETIA) 10 mg tablet     magnesium 30 mg tablet     metoprolol succinate (TOPROL-XL) 25 MG     nitroglycerin (NITROSTAT) 0.4 MG SL tablet     ticagrelor (BRILINTA)  90 mg Tab       ALLERGIES:  No Known Allergies    FAMILY HISTORY:  Family History   Problem Relation Age of Onset     Heart Disease Mother      Heart Disease Father      Urolithiasis No family hx of      Clotting Disorder No family hx of      Gout No family hx of      Diabetes No family hx of      Cancer No family hx of        SOCIAL HISTORY:   Social History     Socioeconomic History     Marital status: Single     Spouse name: Not on file     Number of children: Not on file     Years of education: Not on file     Highest education level: Not on file   Occupational History     Not on file   Tobacco Use     Smoking status: Never Smoker     Smokeless tobacco: Never Used   Substance and Sexual Activity     Alcohol use: No     Comment: Alcoholic Drinks/day: occas wine     Drug use: No     Sexual activity: Not on file   Other Topics Concern     Not on file   Social History Narrative     Not on file     Social Determinants of Health     Financial Resource Strain: Not on file   Food Insecurity: Not on file   Transportation Needs: Not on file   Physical Activity: Not on file   Stress: Not on file   Social Connections: Not on file   Intimate Partner Violence: Not on file   Housing Stability: Not on file       VITALS:  /55   Pulse 69   Temp 98  F (36.7  C) (Temporal)   Resp 16   Wt 48.5 kg (107 lb)   SpO2 97%   BMI 18.95 kg/m      PHYSICAL EXAM:  Physical Exam  Constitutional:       General: She is not in acute distress.     Appearance: She is not diaphoretic.   HENT:      Head: Atraumatic.      Mouth/Throat:      Pharynx: No oropharyngeal exudate.   Eyes:      General: No scleral icterus.     Pupils: Pupils are equal, round, and reactive to light.   Cardiovascular:      Heart sounds: Normal heart sounds.   Pulmonary:      Effort: No respiratory distress.      Breath sounds: Normal breath sounds.   Abdominal:      General: Bowel sounds are normal.      Palpations: Abdomen is soft.      Tenderness: There is no  abdominal tenderness.   Musculoskeletal:         General: No tenderness.   Skin:     General: Skin is warm.      Findings: No rash.          LAB:  All pertinent labs reviewed and interpreted.  Results for orders placed or performed during the hospital encounter of 12/30/21   Basic metabolic panel   Result Value Ref Range    Sodium 141 136 - 145 mmol/L    Potassium 4.1 3.5 - 5.0 mmol/L    Chloride 109 (H) 98 - 107 mmol/L    Carbon Dioxide (CO2) 22 22 - 31 mmol/L    Anion Gap 10 5 - 18 mmol/L    Urea Nitrogen 24 8 - 28 mg/dL    Creatinine 0.64 0.60 - 1.10 mg/dL    Calcium 9.6 8.5 - 10.5 mg/dL    Glucose 89 70 - 125 mg/dL    GFR Estimate >90 >60 mL/min/1.73m2   CBC with platelets and differential   Result Value Ref Range    WBC Count 7.2 4.0 - 11.0 10e3/uL    RBC Count 4.66 3.80 - 5.20 10e6/uL    Hemoglobin 14.4 11.7 - 15.7 g/dL    Hematocrit 42.8 35.0 - 47.0 %    MCV 92 78 - 100 fL    MCH 30.9 26.5 - 33.0 pg    MCHC 33.6 31.5 - 36.5 g/dL    RDW 13.5 10.0 - 15.0 %    Platelet Count 187 150 - 450 10e3/uL    % Neutrophils 51 %    % Lymphocytes 36 %    % Monocytes 8 %    % Eosinophils 4 %    % Basophils 1 %    % Immature Granulocytes 0 %    NRBCs per 100 WBC 0 <1 /100    Absolute Neutrophils 3.7 1.6 - 8.3 10e3/uL    Absolute Lymphocytes 2.5 0.8 - 5.3 10e3/uL    Absolute Monocytes 0.6 0.0 - 1.3 10e3/uL    Absolute Eosinophils 0.3 0.0 - 0.7 10e3/uL    Absolute Basophils 0.1 0.0 - 0.2 10e3/uL    Absolute Immature Granulocytes 0.0 <=0.4 10e3/uL    Absolute NRBCs 0.0 10e3/uL   Result Value Ref Range    Magnesium 1.9 1.8 - 2.6 mg/dL       RADIOLOGY:  Reviewed all pertinent imaging. Please see official radiology report.  No orders to display       EKG:    Performed at: 7:41 PM  Impression: Sinus arrhythmia, no acute ischemic changes  Rate: 68  Rhythm: Sinus with arrhythmia  Axis: Normal  NJ Interval: 140  QRS Interval: 84  QTc Interval: 450  ST Changes: No acute ischemic changes  Comparison: November 2018, no acute changes are  seen    I have independently reviewed and interpreted the EKG(s) documented above.    I, Jordan Guzman, am serving as a scribe to document services personally performed by Dr. Avina based on my observation and the provider's statements to me. I, Nestor Avina MD attest that Jordan Guzman is acting in a scribe capacity, has observed my performance of the services and has documented them in accordance with my direction.    Nestor Avina M.D.  Emergency Medicine  [facility] EMERGENCY DEPARTMENT  [address] [place] [id number]  [phone]  Dept: [phone]     Nestor Avina MD  12/30/21 1054

## 2022-06-18 ENCOUNTER — HEALTH MAINTENANCE LETTER (OUTPATIENT)
Age: 72
End: 2022-06-18

## 2022-10-29 ENCOUNTER — HEALTH MAINTENANCE LETTER (OUTPATIENT)
Age: 72
End: 2022-10-29

## 2023-05-29 ENCOUNTER — HOSPITAL ENCOUNTER (EMERGENCY)
Facility: HOSPITAL | Age: 73
Discharge: HOME | End: 2023-05-29
Attending: EMERGENCY MEDICINE | Admitting: EMERGENCY MEDICINE
Payer: COMMERCIAL

## 2023-05-29 VITALS
BODY MASS INDEX: 19.67 KG/M2 | RESPIRATION RATE: 16 BRPM | HEIGHT: 63 IN | DIASTOLIC BLOOD PRESSURE: 63 MMHG | SYSTOLIC BLOOD PRESSURE: 101 MMHG | HEART RATE: 64 BPM | OXYGEN SATURATION: 97 % | TEMPERATURE: 98.4 F | WEIGHT: 111 LBS

## 2023-05-29 DIAGNOSIS — R31.9 URINARY TRACT INFECTION WITH HEMATURIA, SITE UNSPECIFIED: ICD-10-CM

## 2023-05-29 DIAGNOSIS — N39.0 URINARY TRACT INFECTION WITH HEMATURIA, SITE UNSPECIFIED: ICD-10-CM

## 2023-05-29 LAB
ALBUMIN UR-MCNC: 20 MG/DL
ANION GAP SERPL CALCULATED.3IONS-SCNC: 11 MMOL/L (ref 7–15)
APPEARANCE UR: CLEAR
BACTERIA #/AREA URNS HPF: ABNORMAL /HPF
BASOPHILS # BLD AUTO: 0 10E3/UL (ref 0–0.2)
BASOPHILS NFR BLD AUTO: 1 %
BILIRUB UR QL STRIP: NEGATIVE
BUN SERPL-MCNC: 16.6 MG/DL (ref 8–23)
CALCIUM SERPL-MCNC: 9 MG/DL (ref 8.8–10.2)
CHLORIDE SERPL-SCNC: 99 MMOL/L (ref 98–107)
COLOR UR AUTO: YELLOW
CREAT SERPL-MCNC: 0.79 MG/DL (ref 0.51–0.95)
DEPRECATED HCO3 PLAS-SCNC: 26 MMOL/L (ref 22–29)
EOSINOPHIL # BLD AUTO: 0.2 10E3/UL (ref 0–0.7)
EOSINOPHIL NFR BLD AUTO: 4 %
ERYTHROCYTE [DISTWIDTH] IN BLOOD BY AUTOMATED COUNT: 14.3 % (ref 10–15)
GFR SERPL CREATININE-BSD FRML MDRD: 79 ML/MIN/1.73M2
GLUCOSE SERPL-MCNC: 93 MG/DL (ref 70–99)
GLUCOSE UR STRIP-MCNC: NEGATIVE MG/DL
HCT VFR BLD AUTO: 42 % (ref 35–47)
HGB BLD-MCNC: 14 G/DL (ref 11.7–15.7)
HGB UR QL STRIP: ABNORMAL
IMM GRANULOCYTES # BLD: 0 10E3/UL
IMM GRANULOCYTES NFR BLD: 0 %
KETONES UR STRIP-MCNC: NEGATIVE MG/DL
LEUKOCYTE ESTERASE UR QL STRIP: NEGATIVE
LYMPHOCYTES # BLD AUTO: 0.9 10E3/UL (ref 0.8–5.3)
LYMPHOCYTES NFR BLD AUTO: 16 %
MCH RBC QN AUTO: 30.6 PG (ref 26.5–33)
MCHC RBC AUTO-ENTMCNC: 33.3 G/DL (ref 31.5–36.5)
MCV RBC AUTO: 92 FL (ref 78–100)
MONOCYTES # BLD AUTO: 1 10E3/UL (ref 0–1.3)
MONOCYTES NFR BLD AUTO: 18 %
MUCOUS THREADS #/AREA URNS LPF: PRESENT /LPF
NEUTROPHILS # BLD AUTO: 3.3 10E3/UL (ref 1.6–8.3)
NEUTROPHILS NFR BLD AUTO: 61 %
NITRATE UR QL: NEGATIVE
NRBC # BLD AUTO: 0 10E3/UL
NRBC BLD AUTO-RTO: 0 /100
PH UR STRIP: 5.5 [PH] (ref 5–7)
PLATELET # BLD AUTO: 156 10E3/UL (ref 150–450)
POTASSIUM SERPL-SCNC: 4.5 MMOL/L (ref 3.4–5.3)
RBC # BLD AUTO: 4.57 10E6/UL (ref 3.8–5.2)
RBC URINE: 9 /HPF
SODIUM SERPL-SCNC: 136 MMOL/L (ref 136–145)
SP GR UR STRIP: 1.02 (ref 1–1.03)
TRANSITIONAL EPI: <1 /HPF
UROBILINOGEN UR STRIP-MCNC: <2 MG/DL
WBC # BLD AUTO: 5.4 10E3/UL (ref 4–11)
WBC URINE: 3 /HPF

## 2023-05-29 PROCEDURE — 80048 BASIC METABOLIC PNL TOTAL CA: CPT | Performed by: EMERGENCY MEDICINE

## 2023-05-29 PROCEDURE — 36415 COLL VENOUS BLD VENIPUNCTURE: CPT | Performed by: EMERGENCY MEDICINE

## 2023-05-29 PROCEDURE — 99283 EMERGENCY DEPT VISIT LOW MDM: CPT

## 2023-05-29 PROCEDURE — 81001 URINALYSIS AUTO W/SCOPE: CPT | Performed by: EMERGENCY MEDICINE

## 2023-05-29 PROCEDURE — 85025 COMPLETE CBC W/AUTO DIFF WBC: CPT | Performed by: EMERGENCY MEDICINE

## 2023-05-29 RX ORDER — CEPHALEXIN 500 MG/1
500 CAPSULE ORAL ONCE
Status: DISCONTINUED | OUTPATIENT
Start: 2023-05-29 | End: 2023-05-29 | Stop reason: HOSPADM

## 2023-05-29 RX ORDER — CEPHALEXIN 500 MG/1
500 CAPSULE ORAL 4 TIMES DAILY
Qty: 28 CAPSULE | Refills: 0 | Status: SHIPPED | OUTPATIENT
Start: 2023-05-29 | End: 2023-06-05

## 2023-05-29 ASSESSMENT — ACTIVITIES OF DAILY LIVING (ADL): ADLS_ACUITY_SCORE: 33

## 2023-05-29 NOTE — ED PROVIDER NOTES
EMERGENCY DEPARTMENT ENCOUNTER      NAME: Sarai Steiner  AGE: 73 year old female  YOB: 1950  MRN: [id number]  EVALUATION DATE & TIME: 5/29/2023 12:20 PM    PCP: Jaison Lloyd    ED PROVIDER: Raina Cee M.D.      Chief Complaint   Patient presents with     Fever         FINAL IMPRESSION:  1. Urinary tract infection with hematuria, site unspecified          ED COURSE & MEDICAL DECISION MAKING:    ED Course as of 05/29/23 1354   Mon May 29, 2023   1256 Patient with atypical overnight fever now resolved, no abdominal pain, no SOB or cough or pulmonary complaints or tachypnea or hypoxia or anomalies on auscultation to lungs, COVID19 PCR underway, UA pending with cloudy urine to assess for UTI. No facial swelling/drainage/trismus or dental pain now to suggest lingering dental infection s/p recent completion of augmentin with flagyl for dental infection, no diarrhea today or blood in stool or abdominal pain today to suggest C diff. She is ameanble to screening CBC/chemistry, UA and COVID19 PCR and notes she is here because she primarily wants reassurance that she is not developing sepsis, which is overall quite reasonable. With VS normal, no SIRS present therefore even if WBC elevated after recent treatment which can certainly occur, sepsis not present at this time.   1304 Patient declined COVID19 PCR   1352 UA with some RBC and WBC to suggest possible early UTI and WBC reassuringly normal at 5.4 thus no sepsis present, and no fever/tachycardia or antipyretic use today, uanble to knkow if influenza or COVID19 as she declined that test, begun on keflex antibiotic therapy for likely early UTI. Patient discharged after being provided with extensive anticipatory guidance and given return precautions, importance of PMD follow-up emphasized.        Pertinent Labs & Imaging studies reviewed. (See chart for details)    N95 worn  A face shield was worn also  COVID PPE    12:39 PM I met with the patient to  gather history and to perform my initial exam. We discussed plans for the ED course, including diagnostic testing and treatment.     Medical Decision Making    History:    Supplemental history from: Documented in chart, if applicable    External Record(s) reviewed: Documented in chart, if applicable.    Work Up:    Chart documentation includes differential considered and any EKGs or imaging independently interpreted by provider, where specified.    In additional to work up documented, I considered the following work up: Documented in chart, if applicable.    External consultation:    Discussion of management with another provider: Documented in chart, if applicable    Complicating factors:    Care impacted by chronic illness: Heart Disease and Hyperlipidemia    Care affected by social determinants of health: Access to Medical Care    Disposition considerations: Discharge. I prescribed additional prescription strength medication(s) as charted. I considered admission, but discharged patient after significant clinical improvement.        At the conclusion of the encounter I discussed the results of all of the tests and the disposition. The questions were answered. The patient or family acknowledged understanding and was agreeable with the care plan.     MEDICATIONS GIVEN IN THE EMERGENCY:  Medications   cephALEXin (KEFLEX) capsule 500 mg (has no administration in time range)       NEW PRESCRIPTIONS STARTED AT TODAY'S ER VISIT  New Prescriptions    CEPHALEXIN (KEFLEX) 500 MG CAPSULE    Take 1 capsule (500 mg) by mouth 4 times daily for 7 days          =================================================================    HPI    Sarai Steiner is a 73 year old female with PMHx of diverticular disease of large intensities, ischemic cardiomyopathy, ST elevation, PAF on blood thinners, CAD with remote MI, Systolic CHF, hyperlipidemia, heart failure, STEMI, who presents to the ED today via private car with fever.     Patient  reports seven weeks ago she developed an abscess in her upper tooth. Five weeks ago she had a root canal completed and during this it was noted the next tooth might be an issue. Started on amoxicillin and Flagyl after procedure. Endorses abdominal pain, chills, and diarrhea after antibiotics ise. She developed a fever last night of 103 degrees. Today her temperature is normal. Endorses dull pain throughout body and back pain she describes as menstrual like pains. She presented to the ED today to rule out sepsis. Of note, patient endorses recent cloudy urine. No known sick contacts. No use of pain medication today.     Denies current abdominal pain, diarrhea, melena.     Denies coughing, shortness of breath, vomiting, rashes, dysuria, hematuria, frequency.       REVIEW OF SYSTEMS   All other systems reviewed and are negative except as noted above in HPI.    PAST MEDICAL HISTORY:  No past medical history on file.    PAST SURGICAL HISTORY:  Past Surgical History:   Procedure Laterality Date     CARDIAC CATHETERIZATION  12/19/2014     CORONARY STENT PLACEMENT  11/29/2018     CV CORONARY ANGIOGRAM N/A 11/29/2018    Procedure: Coronary Angiogram;  Surgeon: Brent Bell MD;  Location: [facility];  Service: Cardiology     HYSTERECTOMY       RELEASE TRIGGER FINGER Bilateral      URETEROSCOPY  2006       CURRENT MEDICATIONS:    cephALEXin (KEFLEX) 500 MG capsule  aspirin 81 mg chewable tablet  atorvastatin (LIPITOR) 80 MG tablet  calcium carbonate-vitamin D2 500 mg(1,250mg) -200 unit tablet  COQ10, UBIQUINOL, ORAL  ezetimibe (ZETIA) 10 mg tablet  magnesium 30 mg tablet  metoprolol succinate (TOPROL-XL) 25 MG  nitroglycerin (NITROSTAT) 0.4 MG SL tablet  ticagrelor (BRILINTA) 90 mg Tab        ALLERGIES:  No Known Allergies    FAMILY HISTORY:  Family History   Problem Relation Age of Onset     Heart Disease Mother      Heart Disease Father      Urolithiasis No family hx of      Clotting Disorder No family hx of   "    Gout No family hx of      Diabetes No family hx of      Cancer No family hx of        SOCIAL HISTORY:   Social History     Socioeconomic History     Marital status: Single   Tobacco Use     Smoking status: Never     Smokeless tobacco: Never   Substance and Sexual Activity     Alcohol use: No     Comment: Alcoholic Drinks/day: occas wine     Drug use: No       VITALS:  Patient Vitals for the past 24 hrs:   BP Temp Temp src Pulse Resp SpO2 Height Weight   05/29/23 1214 101/63 98.4  F (36.9  C) Oral 64 16 97 % 1.6 m (5' 3\") 50.3 kg (111 lb)       PHYSICAL EXAM    GENERAL: Awake, alert.  In no acute distress.   HEENT: Normocephalic, atraumatic.  Pupils equal, round and reactive.  Conjunctiva normal.  EOMI.  NECK: No stridor or apparent deformity.  PULMONARY: Symmetrical breath sounds without distress.  Lungs clear to auscultation bilaterally without wheezes, rhonchi or rales.  CARDIO: Regular rate and rhythm.  No significant murmur, rub or gallop.  Radial pulses strong and symmetrical.  ABDOMINAL: Abdomen soft, non-distended and non-tender to palpation.  No CVAT, no palpable hepatosplenomegaly.  EXTREMITIES: No lower extremity swelling or edema.    NEURO: Alert and oriented to person, place and time.  Cranial nerves grossly intact.  No focal motor deficit.  PSYCH: Normal mood and affect  SKIN: No rashes      LAB:  All pertinent labs reviewed and interpreted.  Results for orders placed or performed during the hospital encounter of 05/29/23   UA with Microscopic reflex to Culture    Specimen: Urine, Midstream   Result Value Ref Range    Color Urine Yellow Colorless, Straw, Light Yellow, Yellow    Appearance Urine Clear Clear    Glucose Urine Negative Negative mg/dL    Bilirubin Urine Negative Negative    Ketones Urine Negative Negative mg/dL    Specific Gravity Urine 1.025 1.001 - 1.030    Blood Urine 0.1 mg/dL (A) Negative    pH Urine 5.5 5.0 - 7.0    Protein Albumin Urine 20 (A) Negative mg/dL    Urobilinogen Urine " <2.0 <2.0 mg/dL    Nitrite Urine Negative Negative    Leukocyte Esterase Urine Negative Negative    Bacteria Urine Few (A) None Seen /HPF    Mucus Urine Present (A) None Seen /LPF    RBC Urine 9 (H) <=2 /HPF    WBC Urine 3 <=5 /HPF    Transitional Epithelials Urine <1 <=1 /HPF   Basic metabolic panel   Result Value Ref Range    Sodium 136 136 - 145 mmol/L    Potassium 4.5 3.4 - 5.3 mmol/L    Chloride 99 98 - 107 mmol/L    Carbon Dioxide (CO2) 26 22 - 29 mmol/L    Anion Gap 11 7 - 15 mmol/L    Urea Nitrogen 16.6 8.0 - 23.0 mg/dL    Creatinine 0.79 0.51 - 0.95 mg/dL    Calcium 9.0 8.8 - 10.2 mg/dL    Glucose 93 70 - 99 mg/dL    GFR Estimate 79 >60 mL/min/1.73m2   CBC with platelets and differential   Result Value Ref Range    WBC Count 5.4 4.0 - 11.0 10e3/uL    RBC Count 4.57 3.80 - 5.20 10e6/uL    Hemoglobin 14.0 11.7 - 15.7 g/dL    Hematocrit 42.0 35.0 - 47.0 %    MCV 92 78 - 100 fL    MCH 30.6 26.5 - 33.0 pg    MCHC 33.3 31.5 - 36.5 g/dL    RDW 14.3 10.0 - 15.0 %    Platelet Count 156 150 - 450 10e3/uL    % Neutrophils 61 %    % Lymphocytes 16 %    % Monocytes 18 %    % Eosinophils 4 %    % Basophils 1 %    % Immature Granulocytes 0 %    NRBCs per 100 WBC 0 <1 /100    Absolute Neutrophils 3.3 1.6 - 8.3 10e3/uL    Absolute Lymphocytes 0.9 0.8 - 5.3 10e3/uL    Absolute Monocytes 1.0 0.0 - 1.3 10e3/uL    Absolute Eosinophils 0.2 0.0 - 0.7 10e3/uL    Absolute Basophils 0.0 0.0 - 0.2 10e3/uL    Absolute Immature Granulocytes 0.0 <=0.4 10e3/uL    Absolute NRBCs 0.0 10e3/uL         I, Paradise Aguiar, am serving as a scribe to document services personally performed by Dr. Raina Cee based on my observation and the provider's statements to me. I, Raina Cee MD attest that Paradise Aguiar is acting in a scribe capacity, has observed my performance of the services and has documented them in accordance with my direction.       Raina Cee MD  05/29/23 1190

## 2023-05-29 NOTE — ED TRIAGE NOTES
States had a root canal with abscess 5 weeks ago. States Sat having drainage and body aches and fever. Denies nausea. Did not take meds for the fever. States fever today was 100.

## 2023-06-01 ENCOUNTER — HEALTH MAINTENANCE LETTER (OUTPATIENT)
Age: 73
End: 2023-06-01

## 2023-06-25 ENCOUNTER — HEALTH MAINTENANCE LETTER (OUTPATIENT)
Age: 73
End: 2023-06-25

## 2024-08-18 ENCOUNTER — HEALTH MAINTENANCE LETTER (OUTPATIENT)
Age: 74
End: 2024-08-18

## 2024-12-03 ENCOUNTER — LAB REQUISITION (OUTPATIENT)
Dept: LAB | Facility: CLINIC | Age: 74
End: 2024-12-03

## 2024-12-03 DIAGNOSIS — I48.91 UNSPECIFIED ATRIAL FIBRILLATION (H): ICD-10-CM

## 2024-12-03 LAB
ANION GAP SERPL CALCULATED.3IONS-SCNC: 11 MMOL/L (ref 7–15)
BUN SERPL-MCNC: 19.3 MG/DL (ref 8–23)
CALCIUM SERPL-MCNC: 9.5 MG/DL (ref 8.8–10.4)
CHLORIDE SERPL-SCNC: 101 MMOL/L (ref 98–107)
CREAT SERPL-MCNC: 0.66 MG/DL (ref 0.51–0.95)
EGFRCR SERPLBLD CKD-EPI 2021: >90 ML/MIN/1.73M2
GLUCOSE SERPL-MCNC: 91 MG/DL (ref 70–99)
HCO3 SERPL-SCNC: 26 MMOL/L (ref 22–29)
POTASSIUM SERPL-SCNC: 4.1 MMOL/L (ref 3.4–5.3)
SODIUM SERPL-SCNC: 138 MMOL/L (ref 135–145)

## 2024-12-03 PROCEDURE — 80048 BASIC METABOLIC PNL TOTAL CA: CPT | Performed by: FAMILY MEDICINE

## 2024-12-03 PROCEDURE — 82310 ASSAY OF CALCIUM: CPT | Performed by: FAMILY MEDICINE

## 2025-05-15 ENCOUNTER — HOSPITAL ENCOUNTER (EMERGENCY)
Facility: HOSPITAL | Age: 75
Discharge: HOME | End: 2025-05-15
Attending: EMERGENCY MEDICINE
Payer: COMMERCIAL

## 2025-05-15 VITALS
HEIGHT: 63 IN | SYSTOLIC BLOOD PRESSURE: 106 MMHG | RESPIRATION RATE: 25 BRPM | TEMPERATURE: 97.8 F | WEIGHT: 110 LBS | DIASTOLIC BLOOD PRESSURE: 61 MMHG | OXYGEN SATURATION: 100 % | HEART RATE: 55 BPM | BODY MASS INDEX: 19.49 KG/M2

## 2025-05-15 DIAGNOSIS — I48.0 PAROXYSMAL ATRIAL FIBRILLATION (H): ICD-10-CM

## 2025-05-15 LAB
ANION GAP SERPL CALCULATED.3IONS-SCNC: 12 MMOL/L (ref 7–15)
ATRIAL RATE - MUSE: 131 BPM
ATRIAL RATE - MUSE: 58 BPM
BASOPHILS # BLD AUTO: 0.1 10E3/UL (ref 0–0.2)
BASOPHILS NFR BLD AUTO: 1 %
BUN SERPL-MCNC: 25.2 MG/DL (ref 8–23)
CALCIUM SERPL-MCNC: 9.6 MG/DL (ref 8.8–10.4)
CHLORIDE SERPL-SCNC: 103 MMOL/L (ref 98–107)
CREAT SERPL-MCNC: 0.69 MG/DL (ref 0.51–0.95)
DIASTOLIC BLOOD PRESSURE - MUSE: 61 MMHG
DIASTOLIC BLOOD PRESSURE - MUSE: NORMAL MMHG
EGFRCR SERPLBLD CKD-EPI 2021: >90 ML/MIN/1.73M2
EOSINOPHIL # BLD AUTO: 0.3 10E3/UL (ref 0–0.7)
EOSINOPHIL NFR BLD AUTO: 4 %
ERYTHROCYTE [DISTWIDTH] IN BLOOD BY AUTOMATED COUNT: 13.1 % (ref 10–15)
GLUCOSE SERPL-MCNC: 110 MG/DL (ref 70–99)
HCO3 SERPL-SCNC: 24 MMOL/L (ref 22–29)
HCT VFR BLD AUTO: 45.9 % (ref 35–47)
HGB BLD-MCNC: 15.1 G/DL (ref 11.7–15.7)
HOLD SPECIMEN: NORMAL
IMM GRANULOCYTES # BLD: 0 10E3/UL
IMM GRANULOCYTES NFR BLD: 0 %
INTERPRETATION ECG - MUSE: NORMAL
INTERPRETATION ECG - MUSE: NORMAL
LYMPHOCYTES # BLD AUTO: 3.1 10E3/UL (ref 0.8–5.3)
LYMPHOCYTES NFR BLD AUTO: 45 %
MAGNESIUM SERPL-MCNC: 1.9 MG/DL (ref 1.7–2.3)
MCH RBC QN AUTO: 30.3 PG (ref 26.5–33)
MCHC RBC AUTO-ENTMCNC: 32.9 G/DL (ref 31.5–36.5)
MCV RBC AUTO: 92 FL (ref 78–100)
MONOCYTES # BLD AUTO: 0.7 10E3/UL (ref 0–1.3)
MONOCYTES NFR BLD AUTO: 9 %
NEUTROPHILS # BLD AUTO: 2.8 10E3/UL (ref 1.6–8.3)
NEUTROPHILS NFR BLD AUTO: 41 %
NRBC # BLD AUTO: 0 10E3/UL
NRBC BLD AUTO-RTO: 0 /100
P AXIS - MUSE: 74 DEGREES
P AXIS - MUSE: NORMAL DEGREES
PLATELET # BLD AUTO: 214 10E3/UL (ref 150–450)
POTASSIUM SERPL-SCNC: 3.5 MMOL/L (ref 3.4–5.3)
PR INTERVAL - MUSE: 172 MS
PR INTERVAL - MUSE: NORMAL MS
QRS DURATION - MUSE: 100 MS
QRS DURATION - MUSE: 84 MS
QT - MUSE: 338 MS
QT - MUSE: 442 MS
QTC - MUSE: 433 MS
QTC - MUSE: 510 MS
R AXIS - MUSE: 40 DEGREES
R AXIS - MUSE: 46 DEGREES
RBC # BLD AUTO: 4.98 10E6/UL (ref 3.8–5.2)
SODIUM SERPL-SCNC: 139 MMOL/L (ref 135–145)
SYSTOLIC BLOOD PRESSURE - MUSE: 106 MMHG
SYSTOLIC BLOOD PRESSURE - MUSE: NORMAL MMHG
T AXIS - MUSE: 82 DEGREES
T AXIS - MUSE: 89 DEGREES
VENTRICULAR RATE- MUSE: 137 BPM
VENTRICULAR RATE- MUSE: 58 BPM
WBC # BLD AUTO: 6.9 10E3/UL (ref 4–11)

## 2025-05-15 PROCEDURE — 83735 ASSAY OF MAGNESIUM: CPT | Performed by: EMERGENCY MEDICINE

## 2025-05-15 PROCEDURE — 250N000009 HC RX 250: Performed by: EMERGENCY MEDICINE

## 2025-05-15 PROCEDURE — 85025 COMPLETE CBC W/AUTO DIFF WBC: CPT | Performed by: EMERGENCY MEDICINE

## 2025-05-15 PROCEDURE — 96361 HYDRATE IV INFUSION ADD-ON: CPT

## 2025-05-15 PROCEDURE — 36415 COLL VENOUS BLD VENIPUNCTURE: CPT | Performed by: EMERGENCY MEDICINE

## 2025-05-15 PROCEDURE — 258N000003 HC RX IP 258 OP 636: Performed by: EMERGENCY MEDICINE

## 2025-05-15 PROCEDURE — 93005 ELECTROCARDIOGRAM TRACING: CPT | Performed by: EMERGENCY MEDICINE

## 2025-05-15 PROCEDURE — 99284 EMERGENCY DEPT VISIT MOD MDM: CPT | Mod: 25

## 2025-05-15 PROCEDURE — 80048 BASIC METABOLIC PNL TOTAL CA: CPT | Performed by: EMERGENCY MEDICINE

## 2025-05-15 PROCEDURE — 96374 THER/PROPH/DIAG INJ IV PUSH: CPT

## 2025-05-15 RX ORDER — METOPROLOL TARTRATE 1 MG/ML
5 INJECTION, SOLUTION INTRAVENOUS ONCE
Status: COMPLETED | OUTPATIENT
Start: 2025-05-15 | End: 2025-05-15

## 2025-05-15 RX ADMIN — METOPROLOL TARTRATE 5 MG: 5 INJECTION INTRAVENOUS at 07:56

## 2025-05-15 RX ADMIN — SODIUM CHLORIDE 1000 ML: 0.9 INJECTION, SOLUTION INTRAVENOUS at 07:53

## 2025-05-15 ASSESSMENT — ACTIVITIES OF DAILY LIVING (ADL)
ADLS_ACUITY_SCORE: 41
ADLS_ACUITY_SCORE: 41

## 2025-05-15 ASSESSMENT — COLUMBIA-SUICIDE SEVERITY RATING SCALE - C-SSRS
1. IN THE PAST MONTH, HAVE YOU WISHED YOU WERE DEAD OR WISHED YOU COULD GO TO SLEEP AND NOT WAKE UP?: NO
6. HAVE YOU EVER DONE ANYTHING, STARTED TO DO ANYTHING, OR PREPARED TO DO ANYTHING TO END YOUR LIFE?: NO
2. HAVE YOU ACTUALLY HAD ANY THOUGHTS OF KILLING YOURSELF IN THE PAST MONTH?: NO

## 2025-05-15 NOTE — ED TRIAGE NOTES
Patient presents here for evaluation of a rapid, irregular heart rhythm that she suspects that she is in atrial fibrillation, which she has experienced in the past. She has not been converted with meds or cardioverted in the past episodes. Current episode has persisted much longer than past occurences. She noted current episode at 06:30 am this morning.     Triage Assessment (Adult)       Row Name 05/15/25 0730          Triage Assessment    Airway WDL WDL        Respiratory WDL    Respiratory WDL WDL        Skin Circulation/Temperature WDL    Skin Circulation/Temperature WDL WDL        Cardiac WDL    Cardiac WDL rhythm     Cardiac Rhythm Other (Comment)        Peripheral/Neurovascular WDL    Peripheral Neurovascular WDL WDL        Cognitive/Neuro/Behavioral WDL    Cognitive/Neuro/Behavioral WDL WDL

## 2025-05-15 NOTE — ED PROVIDER NOTES
"  Emergency Department Encounter     Evaluation Date & Time:   5/15/2025  7:31 AM    CHIEF COMPLAINT:  Tachycardia      Triage Note:Patient presents here for evaluation of a rapid, irregular heart rhythm that she suspects that she is in atrial fibrillation, which she has experienced in the past. She has not been converted with meds or cardioverted in the past episodes. Current episode has persisted much longer than past occurences. She noted current episode at 06:30 am this morning.        ED COURSE & MEDICAL DECISION MAKING:     Pt with a history of PAF on Eliquis, last in Afib in December. Pt reports she feels when she goes in every time. She was well last night, woke up this morning around 0620 with sensation of Afib, confirmed on her cardia device at home.  Reports irregular palpitations and not resolving over next 45 minutes, so she came in now. Denies recent illness.  Feels \"fatigued\" and some dizziness with it.  Denies chest pain, dyspnea, leg pain/swelling. Pt is on daily metoprolol and takes her Eliquis regularly.  Will get labs, hydrate, try IV metoprolol as HR 140s in AFib.  Could consider cardioversion if she remains symptomatic in Afib, which we discussed.      ED Course as of 05/15/25 1014   Thu May 15, 2025   0742 Met with patient for initial interview and exam.    0809 BUN/Cr ratio consistent with some pre-renal dehydration. Normal Hgb.  Pt receiving IVF. HR currently low 100s in Afib still on monitor.    Electrolytes reassuring.   0901 Pt appears to be in NSR on monitor. Will get repeat EKG, reassess.   0919 I rechecked the patient and discussed results, discharge, follow up, and reasons to return to the ED. We discussed the plan for discharge and the patient is agreeable. Reviewed supportive cares, symptomatic treatment, outpatient follow up, and reasons to return to the Emergency Department. Patient to be discharged by ED RN.    Pt remains in NSR.  Encouraged her to call cardiologist to discuss " meds, possible earlier follow up.         Medical Decision Making  Care impacted by PAF, Anticoagulated State, and Heart Disease  Discharge. No recommendations on prescription strength medication(s). See documentation for any additional details.    MIPS (CTPE, Dental pain, Dawson, Sinusitis, Asthma/COPD, Head Trauma): Not Applicable    SEPSIS: None          MEDICATIONS GIVEN IN THE EMERGENCY DEPARTMENT:  Medications   sodium chloride 0.9% BOLUS 1,000 mL (0 mLs Intravenous Stopped 5/15/25 0985)   metoprolol (LOPRESSOR) injection 5 mg (5 mg Intravenous $Given 5/15/25 0756)       NEW PRESCRIPTIONS STARTED AT TODAY'S ED VISIT:  Discharge Medication List as of 5/15/2025  9:38 AM          [facility]   HPI     Sarai Steiner is a 75 year old female with a pertinent history of CHF, paroxysmal afib, CAD, hyperlipidemia, STEMI (11/29/2018), who presents to this ED via private car for evaluation of tachycardia. Patient woke up in atrial fibrillation with a fast heart rate. Afib confirmed on her monitor at home, monitor showed heart rate of 150. The last couple of days she has felt an irregular beat in the afternoon, but only lasts a few seconds. She is also experiencing dizziness and tiredness secondary to afib. She takes Eliquis and metoprolol as prescribed without recent medication changes. She denies chest pain, chest discomfort, loss of consciousness, episodes of syncope, nausea, vomiting, fever, diarrhea, melena, or hematochezia.     Per chart review, patient seen at [facility] on 12/03/2024 for annual Medicare wellness exam. Patient to follow up in 1 year with PCP and follow up with cardiology as needed.     REVIEW OF SYSTEMS:  See HPI      Medical History   No past medical history on file.    Past Surgical History:   Procedure Laterality Date    CARDIAC CATHETERIZATION  12/19/2014    CORONARY STENT PLACEMENT  11/29/2018    CV CORONARY ANGIOGRAM N/A 11/29/2018    Procedure: Coronary Angiogram;  Surgeon: Brent FRANK  "MD Ray;  Location: [facility];  Service: Cardiology    HYSTERECTOMY      RELEASE TRIGGER FINGER Bilateral     URETEROSCOPY  2006       Family History   Problem Relation Age of Onset    Heart Disease Mother     Heart Disease Father     Urolithiasis No family hx of     Clotting Disorder No family hx of     Gout No family hx of     Diabetes No family hx of     Cancer No family hx of        Social History     Tobacco Use    Smoking status: Never    Smokeless tobacco: Never   Substance Use Topics    Alcohol use: No     Comment: Alcoholic Drinks/day: occas wine    Drug use: No       aspirin 81 mg chewable tablet  atorvastatin (LIPITOR) 80 MG tablet  calcium carbonate-vitamin D2 500 mg(1,250mg) -200 unit tablet  COQ10, UBIQUINOL, ORAL  ezetimibe (ZETIA) 10 mg tablet  magnesium 30 mg tablet  metoprolol succinate (TOPROL-XL) 25 MG  nitroglycerin (NITROSTAT) 0.4 MG SL tablet  ticagrelor (BRILINTA) 90 mg Tab        Physical Exam     Vitals:  /61   Pulse 55   Temp 97.8  F (36.6  C) (Oral)   Resp 25   Ht 1.6 m (5' 3\")   Wt 49.9 kg (110 lb)   SpO2 100%   BMI 19.49 kg/m      PHYSICAL EXAM:   Physical Exam  Vitals and nursing note reviewed.   Constitutional:       General: She is not in acute distress.     Appearance: Normal appearance.   HENT:      Head: Normocephalic and atraumatic.      Nose: Nose normal.      Mouth/Throat:      Mouth: Mucous membranes are moist.      Comments: No JVD.   Eyes:      Pupils: Pupils are equal, round, and reactive to light.   Cardiovascular:      Rate and Rhythm: Tachycardia present.      Pulses: Normal pulses.           Radial pulses are 2+ on the right side and 2+ on the left side.        Dorsalis pedis pulses are 2+ on the right side and 2+ on the left side.      Comments: Irregularly irregular rhythm.   Pulmonary:      Effort: Pulmonary effort is normal. No respiratory distress.      Breath sounds: Normal breath sounds.   Abdominal:      Palpations: Abdomen is soft.    "   Tenderness: There is no abdominal tenderness.   Musculoskeletal:      Cervical back: Full passive range of motion without pain and neck supple.      Comments: No calf tenderness or swelling b/l   Skin:     General: Skin is warm.      Findings: No rash.   Neurological:      General: No focal deficit present.      Mental Status: She is alert. Mental status is at baseline.      Comments: Fluent speech, no acute lateralizing deficits   Psychiatric:         Mood and Affect: Mood normal.         Behavior: Behavior normal.         Results     LAB:  All pertinent labs reviewed and interpreted  Labs Ordered and Resulted from Time of ED Arrival to Time of ED Departure   BASIC METABOLIC PANEL - Abnormal       Result Value    Sodium 139      Potassium 3.5      Chloride 103      Carbon Dioxide (CO2) 24      Anion Gap 12      Urea Nitrogen 25.2 (*)     Creatinine 0.69      GFR Estimate >90      Calcium 9.6      Glucose 110 (*)    MAGNESIUM - Normal    Magnesium 1.9     CBC WITH PLATELETS AND DIFFERENTIAL    WBC Count 6.9      RBC Count 4.98      Hemoglobin 15.1      Hematocrit 45.9      MCV 92      MCH 30.3      MCHC 32.9      RDW 13.1      Platelet Count 214      % Neutrophils 41      % Lymphocytes 45      % Monocytes 9      % Eosinophils 4      % Basophils 1      % Immature Granulocytes 0      NRBCs per 100 WBC 0      Absolute Neutrophils 2.8      Absolute Lymphocytes 3.1      Absolute Monocytes 0.7      Absolute Eosinophils 0.3      Absolute Basophils 0.1      Absolute Immature Granulocytes 0.0      Absolute NRBCs 0.0         RADIOLOGY:  No orders to display                ECG:  EKG 1: Afib with RVR, rate 137, rate related ischemia  EKG 2: Sinus jeanne, rate 58, normal intervals, no acute ischemia, resolution of rate related changes    I have independently reviewed and interpreted the EKG(s) documented above     PROCEDURES:  Procedures:  none      FINAL IMPRESSION:    ICD-10-CM    1. Paroxysmal atrial fibrillation (H)  I48.0            0 minutes of critical care time      I, Denia Chandra, am serving as a scribe to document services personally performed by Dr. Jitendra Gómez, based on my observations and the provider's statements to me. I, Jitendra Gómez, DO attest that Denia Chandra is acting in a scribe capacity, has observed my performance of the services and has documented them in accordance with my direction.      Jitendra Gómez DO  Emergency Medicine  [facility] EMERGENCY DEPARTMENT  5/15/2025  7:52 AM          Jitendra Gómez MD  05/15/25 1014

## 2025-05-15 NOTE — DISCHARGE INSTRUCTIONS
Please call your cardiologist to discuss visit for repeat Afib, as they may want to see you sooner or make changes to medications.  Return for new/worsening concerns.

## 2025-06-15 ENCOUNTER — HEALTH MAINTENANCE LETTER (OUTPATIENT)
Age: 75
End: 2025-06-15